# Patient Record
Sex: FEMALE | Race: WHITE | Employment: UNEMPLOYED | URBAN - METROPOLITAN AREA
[De-identification: names, ages, dates, MRNs, and addresses within clinical notes are randomized per-mention and may not be internally consistent; named-entity substitution may affect disease eponyms.]

---

## 2018-12-07 ENCOUNTER — HOSPITAL ENCOUNTER (EMERGENCY)
Age: 43
Discharge: HOME | End: 2018-12-07
Attending: EMERGENCY MEDICINE
Payer: SELF-PAY

## 2018-12-07 ENCOUNTER — APPOINTMENT (OUTPATIENT)
Dept: GENERAL RADIOLOGY | Age: 43
End: 2018-12-07
Attending: EMERGENCY MEDICINE
Payer: SELF-PAY

## 2018-12-07 VITALS
TEMPERATURE: 98.5 F | RESPIRATION RATE: 18 BRPM | BODY MASS INDEX: 37.25 KG/M2 | DIASTOLIC BLOOD PRESSURE: 108 MMHG | HEART RATE: 99 BPM | HEIGHT: 61 IN | WEIGHT: 197.31 LBS | SYSTOLIC BLOOD PRESSURE: 155 MMHG | OXYGEN SATURATION: 95 %

## 2018-12-07 DIAGNOSIS — J06.9 VIRAL URI WITH COUGH: Primary | ICD-10-CM

## 2018-12-07 DIAGNOSIS — F17.200 SMOKING: ICD-10-CM

## 2018-12-07 LAB
DEPRECATED S PYO AG THROAT QL EIA: NEGATIVE
FLUAV AG NPH QL IA: NEGATIVE
FLUBV AG NOSE QL IA: NEGATIVE

## 2018-12-07 PROCEDURE — 74011250637 HC RX REV CODE- 250/637: Performed by: EMERGENCY MEDICINE

## 2018-12-07 PROCEDURE — 87804 INFLUENZA ASSAY W/OPTIC: CPT

## 2018-12-07 PROCEDURE — 87070 CULTURE OTHR SPECIMN AEROBIC: CPT

## 2018-12-07 PROCEDURE — 71046 X-RAY EXAM CHEST 2 VIEWS: CPT

## 2018-12-07 PROCEDURE — 87880 STREP A ASSAY W/OPTIC: CPT

## 2018-12-07 PROCEDURE — 99283 EMERGENCY DEPT VISIT LOW MDM: CPT

## 2018-12-07 RX ORDER — BENZONATATE 100 MG/1
100 CAPSULE ORAL
Qty: 30 CAP | Refills: 0 | Status: SHIPPED | OUTPATIENT
Start: 2018-12-07 | End: 2018-12-14

## 2018-12-07 RX ORDER — ACETAMINOPHEN 500 MG
1000 TABLET ORAL ONCE
Status: COMPLETED | OUTPATIENT
Start: 2018-12-07 | End: 2018-12-07

## 2018-12-07 RX ADMIN — ACETAMINOPHEN 1000 MG: 500 TABLET ORAL at 00:38

## 2018-12-07 NOTE — ED PROVIDER NOTES
'started coughing more than usual 3-4d ago (non-prod)/ thought it was a new office/ then past 2 days been having body aches/ today had a fever to 102/ took ibu at 6-7 pm'; pt denies HA, vison changes, diff swallowing,exertional CP, SOB, Abd pain, F/Ch, N/V, D/Cons or other current systemic complaints    Pt admits 'still smoking/ coughing seems worse today'; The history is provided by the patient and a relative. Cough   This is a new problem. The current episode started more than 2 days ago. The problem occurs hourly. The problem has been gradually worsening. The cough is non-productive. There has been a fever of 102 - 102.9 F. The fever has been present for less than 1 day. Associated symptoms include chills, ear congestion, headaches, rhinorrhea, sore throat and myalgias. Pertinent negatives include no chest pain, no sweats, no weight loss, no eye redness, no ear pain, no shortness of breath, no wheezing, no nausea, no vomiting and no confusion. Treatments tried: tylenol. The treatment provided moderate relief. Risk factors: denies. She is a smoker. Past medical history comments: pancreatitis, smoker.         Past Medical History:   Diagnosis Date    Pancreatitis        Past Surgical History:   Procedure Laterality Date    HX  SECTION      2 cesareans    HX CHOLECYSTECTOMY           Family History:   Problem Relation Age of Onset    Diabetes Mother     Heart Disease Mother     Hypertension Mother     Alzheimer Mother     Schizophrenia Mother        Social History     Socioeconomic History    Marital status:      Spouse name:     Number of children: Not on file    Years of education: Not on file    Highest education level: Not on file   Social Needs    Financial resource strain: Not on file    Food insecurity - worry: Not on file    Food insecurity - inability: Not on file   [employer] needs - medical: Not on file   [employer] needs - non-medical: Not on file   Occupational History    Not on file   Tobacco Use    Smoking status: Current Every Day Smoker     Packs/day: 1.00     Types: Cigarettes     Start date: 1/1/1999   Substance and Sexual Activity    Alcohol use: No    Drug use: No    Sexual activity: Yes     Partners: Male     Comment:    Other Topics Concern    Not on file   Social History Narrative    Works as / at [address] [place] [address]: Patient has no known allergies. Review of Systems   Constitutional: Positive for chills. Negative for weight loss. HENT: Positive for rhinorrhea and sore throat. Negative for ear pain, trouble swallowing and voice change. Eyes: Negative for redness and visual disturbance. Respiratory: Positive for cough. Negative for choking, chest tightness, shortness of breath, wheezing and stridor. Cardiovascular: Negative for chest pain, palpitations and leg swelling. Gastrointestinal: Negative for abdominal pain, constipation, diarrhea, nausea and vomiting. Genitourinary: Negative for dysuria. Musculoskeletal: Positive for myalgias. Neurological: Positive for headaches. Psychiatric/Behavioral: Negative for confusion. All other systems reviewed and are negative. There were no vitals filed for this visit. Physical Exam   Constitutional: She is oriented to person, place, and time. She appears well-developed and well-nourished. NAD, AxOx4, speaking in complete sentences     HENT:   Head: Normocephalic and atraumatic. Right Ear: Hearing and external ear normal.   Left Ear: Hearing and external ear normal.   Nose: Mucosal edema and rhinorrhea present. Right sinus exhibits no frontal sinus tenderness. Left sinus exhibits no maxillary sinus tenderness and no frontal sinus tenderness. Mouth/Throat: Uvula is midline and mucous membranes are normal. No oral lesions. No uvula swelling. Posterior oropharyngeal erythema present.  No oropharyngeal exudate, posterior oropharyngeal edema or tonsillar abscesses. Tonsils are 1+ on the right. Tonsils are 1+ on the left. No tonsillar exudate. No meningeal signs     Eyes: Conjunctivae are normal. Pupils are equal, round, and reactive to light. Right eye exhibits no discharge. No scleral icterus. Neck: Normal range of motion. Neck supple. No JVD present. No tracheal deviation present. Cardiovascular: Normal rate, regular rhythm and normal heart sounds. Exam reveals no gallop and no friction rub. No murmur heard. Pulmonary/Chest: Effort normal and breath sounds normal. No respiratory distress. She has no wheezes. She has no rales. She exhibits no tenderness. Abdominal: Soft. Bowel sounds are normal. There is no tenderness. There is no rebound and no guarding. nttp     Genitourinary: No vaginal discharge found. Genitourinary Comments: Pt denies urinary/ vaginal complaints   Musculoskeletal: Normal range of motion. She exhibits no edema or tenderness. Neurological: She is alert and oriented to person, place, and time. She displays normal reflexes. No cranial nerve deficit or sensory deficit. She exhibits normal muscle tone. Coordination normal.   Skin: Skin is warm and dry. Capillary refill takes less than 2 seconds. No rash noted. No erythema. No pallor. Psychiatric: She has a normal mood and affect. Her behavior is normal. Thought content normal.   Nursing note and vitals reviewed. MDM       Procedures      1:05 AM pt 'doing better'; agrees w/ plans/ tessaloin perles rx/ told of need to stop smoking  Margoth Grande's  results have been reviewed with her. She has been counseled regarding her diagnosis. She verbally conveys understanding and agreement of the signs, symptoms, diagnosis, treatment and prognosis and additionally agrees to Call/ Arrange follow up as recommended with Dr. None in 24 - 48 hours. She also agrees with the care-plan and conveys that all of her questions have been answered.   I have also put together some discharge instructions for her that include: 1) educational information regarding their diagnosis, 2) how to care for their diagnosis at home, as well a 3) list of reasons why they would want to return to the ED prior to their follow-up appointment, should their condition change or for concerns.

## 2018-12-07 NOTE — ED NOTES
I have reviewed discharge instructions with the patient. The patient verbalized understanding. No changes in pt status. Ambulated out with steady gait.

## 2018-12-07 NOTE — DISCHARGE INSTRUCTIONS
Upper Respiratory Infection (Cold): Care Instructions  Your Care Instructions    An upper respiratory infection, or URI, is an infection of the nose, sinuses, or throat. URIs are spread by coughs, sneezes, and direct contact. The common cold is the most frequent kind of URI. The flu and sinus infections are other kinds of URIs. Almost all URIs are caused by viruses. Antibiotics won't cure them. But you can treat most infections with home care. This may include drinking lots of fluids and taking over-the-counter pain medicine. You will probably feel better in 4 to 10 days. The doctor has checked you carefully, but problems can develop later. If you notice any problems or new symptoms, get medical treatment right away. Follow-up care is a key part of your treatment and safety. Be sure to make and go to all appointments, and call your doctor if you are having problems. It's also a good idea to know your test results and keep a list of the medicines you take. How can you care for yourself at home? · To prevent dehydration, drink plenty of fluids, enough so that your urine is light yellow or clear like water. Choose water and other caffeine-free clear liquids until you feel better. If you have kidney, heart, or liver disease and have to limit fluids, talk with your doctor before you increase the amount of fluids you drink. · Take an over-the-counter pain medicine, such as acetaminophen (Tylenol), ibuprofen (Advil, Motrin), or naproxen (Aleve). Read and follow all instructions on the label. · Before you use cough and cold medicines, check the label. These medicines may not be safe for young children or for people with certain health problems. · Be careful when taking over-the-counter cold or flu medicines and Tylenol at the same time. Many of these medicines have acetaminophen, which is Tylenol. Read the labels to make sure that you are not taking more than the recommended dose.  Too much acetaminophen (Tylenol) can be harmful. · Get plenty of rest.  · Do not smoke or allow others to smoke around you. If you need help quitting, talk to your doctor about stop-smoking programs and medicines. These can increase your chances of quitting for good. When should you call for help? Call 911 anytime you think you may need emergency care. For example, call if:    · You have severe trouble breathing.    Call your doctor now or seek immediate medical care if:    · You seem to be getting much sicker.     · You have new or worse trouble breathing.     · You have a new or higher fever.     · You have a new rash.    Watch closely for changes in your health, and be sure to contact your doctor if:    · You have a new symptom, such as a sore throat, an earache, or sinus pain.     · You cough more deeply or more often, especially if you notice more mucus or a change in the color of your mucus.     · You do not get better as expected. Where can you learn more? Go to http://kirk-keila.info/. Enter J046 in the search box to learn more about \"Upper Respiratory Infection (Cold): Care Instructions. \"  Current as of: December 6, 2017  Content Version: 11.8  © 7617-4774 Healthwise, [employer]. Care instructions adapted under license by [employer] (which disclaims liability or warranty for this information). If you have questions about a medical condition or this instruction, always ask your healthcare professional. [place] [address] any warranty or liability for your use of this information. Stopping Smoking: Care Instructions  Your Care Instructions  Cigarette smokers crave the nicotine in cigarettes. Giving it up is much harder than simply changing a habit. Your body has to stop craving the nicotine. It is hard to quit, but you can do it. There are many tools that people use to quit smoking. You may find that combining tools works best for you.   There are several steps to quitting. First you get ready to quit. Then you get support to help you. After that, you learn new skills and behaviors to become a nonsmoker. For many people, a necessary step is getting and using medicine. Your doctor will help you set up the plan that best meets your needs. You may want to attend a smoking cessation program to help you quit smoking. When you choose a program, look for one that has proven success. Ask your doctor for ideas. You will greatly increase your chances of success if you take medicine as well as get counseling or join a cessation program.  Some of the changes you feel when you first quit tobacco are uncomfortable. Your body will miss the nicotine at first, and you may feel short-tempered and grumpy. You may have trouble sleeping or concentrating. Medicine can help you deal with these symptoms. You may struggle with changing your smoking habits and rituals. The last step is the tricky one: Be prepared for the smoking urge to continue for a time. This is a lot to deal with, but keep at it. You will feel better. Follow-up care is a key part of your treatment and safety. Be sure to make and go to all appointments, and call your doctor if you are having problems. It's also a good idea to know your test results and keep a list of the medicines you take. How can you care for yourself at home? · Ask your family, friends, and coworkers for support. You have a better chance of quitting if you have help and support. · Join a support group, such as Nicotine Anonymous, for people who are trying to quit smoking. · Consider signing up for a smoking cessation program, such as the American Lung Association's Freedom from Smoking program.  · Get text messaging support. Go to the website at www.smokefree. gov to sign up for the [facility] program.  · Set a quit date. Pick your date carefully so that it is not right in the middle of a big deadline or stressful time.  Once you quit, do not even take a puff. Get rid of all ashtrays and lighters after your last cigarette. Clean your house and your clothes so that they do not smell of smoke. · Learn how to be a nonsmoker. Think about ways you can avoid those things that make you reach for a cigarette. ? Avoid situations that put you at greatest risk for smoking. For some people, it is hard to have a drink with friends without smoking. For others, they might skip a coffee break with coworkers who smoke. ? Change your daily routine. Take a different route to work or eat a meal in a different place. · Cut down on stress. Calm yourself or release tension by doing an activity you enjoy, such as reading a book, taking a hot bath, or gardening. · Talk to your doctor or pharmacist about nicotine replacement therapy, which replaces the nicotine in your body. You still get nicotine but you do not use tobacco. Nicotine replacement products help you slowly reduce the amount of nicotine you need. These products come in several forms, many of them available over-the-counter:  ? Nicotine patches  ? Nicotine gum and lozenges  ? Nicotine inhaler  · Ask your doctor about bupropion (Wellbutrin) or varenicline (Chantix), which are prescription medicines. They do not contain nicotine. They help you by reducing withdrawal symptoms, such as stress and anxiety. · Some people find hypnosis, acupuncture, and massage helpful for ending the smoking habit. · Eat a healthy diet and get regular exercise. Having healthy habits will help your body move past its craving for nicotine. · Be prepared to keep trying. Most people are not successful the first few times they try to quit. Do not get mad at yourself if you smoke again. Make a list of things you learned and think about when you want to try again, such as next week, next month, or next year. Where can you learn more? Go to http://kirk-keila.info/.   Enter J390 in the search box to learn more about \"Stopping Smoking: Care Instructions. \"  Current as of: November 29, 2017  Content Version: 11.8  © 0831-2839 Healthwise, [employer]. Care instructions adapted under license by [employer] (which disclaims liability or warranty for this information). If you have questions about a medical condition or this instruction, always ask your healthcare professional. [place] [address] any warranty or liability for your use of this information.

## 2018-12-09 LAB
BACTERIA SPEC CULT: NORMAL
SERVICE CMNT-IMP: NORMAL

## 2019-09-17 ENCOUNTER — APPOINTMENT (OUTPATIENT)
Dept: CT IMAGING | Age: 44
End: 2019-09-17
Attending: EMERGENCY MEDICINE
Payer: MEDICAID

## 2019-09-17 ENCOUNTER — HOSPITAL ENCOUNTER (EMERGENCY)
Age: 44
Discharge: HOME | End: 2019-09-17
Attending: EMERGENCY MEDICINE
Payer: MEDICAID

## 2019-09-17 VITALS
DIASTOLIC BLOOD PRESSURE: 96 MMHG | SYSTOLIC BLOOD PRESSURE: 146 MMHG | BODY MASS INDEX: 32.26 KG/M2 | OXYGEN SATURATION: 94 % | HEIGHT: 61 IN | TEMPERATURE: 98.1 F | HEART RATE: 67 BPM | WEIGHT: 170.86 LBS | RESPIRATION RATE: 16 BRPM

## 2019-09-17 DIAGNOSIS — R11.2 NAUSEA VOMITING AND DIARRHEA: Primary | ICD-10-CM

## 2019-09-17 DIAGNOSIS — E87.6 HYPOKALEMIA: ICD-10-CM

## 2019-09-17 DIAGNOSIS — E83.42 HYPOMAGNESEMIA: ICD-10-CM

## 2019-09-17 DIAGNOSIS — R19.7 NAUSEA VOMITING AND DIARRHEA: Primary | ICD-10-CM

## 2019-09-17 LAB
ALBUMIN SERPL-MCNC: 3.1 G/DL (ref 3.5–5)
ALBUMIN/GLOB SERPL: 0.9 {RATIO} (ref 1.1–2.2)
ALP SERPL-CCNC: 122 U/L (ref 45–117)
ALT SERPL-CCNC: 29 U/L (ref 12–78)
ANION GAP SERPL CALC-SCNC: 13 MMOL/L (ref 5–15)
APPEARANCE UR: CLEAR
AST SERPL-CCNC: 33 U/L (ref 15–37)
ATRIAL RATE: 72 BPM
BASOPHILS # BLD: 0 K/UL (ref 0–0.1)
BASOPHILS NFR BLD: 0 % (ref 0–1)
BILIRUB SERPL-MCNC: 0.7 MG/DL (ref 0.2–1)
BILIRUB UR QL: NEGATIVE
BUN SERPL-MCNC: 3 MG/DL (ref 6–20)
BUN/CREAT SERPL: 5 (ref 12–20)
CALCIUM SERPL-MCNC: 8.8 MG/DL (ref 8.5–10.1)
CALCULATED P AXIS, ECG09: 18 DEGREES
CALCULATED R AXIS, ECG10: -7 DEGREES
CALCULATED T AXIS, ECG11: 4 DEGREES
CHLORIDE SERPL-SCNC: 100 MMOL/L (ref 97–108)
CO2 SERPL-SCNC: 28 MMOL/L (ref 21–32)
COLOR UR: NORMAL
COMMENT, HOLDF: NORMAL
CREAT SERPL-MCNC: 0.65 MG/DL (ref 0.55–1.02)
DIAGNOSIS, 93000: NORMAL
DIFFERENTIAL METHOD BLD: ABNORMAL
EOSINOPHIL # BLD: 0.1 K/UL (ref 0–0.4)
EOSINOPHIL NFR BLD: 1 % (ref 0–7)
ERYTHROCYTE [DISTWIDTH] IN BLOOD BY AUTOMATED COUNT: 12.7 % (ref 11.5–14.5)
GLOBULIN SER CALC-MCNC: 3.6 G/DL (ref 2–4)
GLUCOSE SERPL-MCNC: 117 MG/DL (ref 65–100)
GLUCOSE UR STRIP.AUTO-MCNC: NEGATIVE MG/DL
HCG UR QL: NEGATIVE
HCT VFR BLD AUTO: 44.1 % (ref 35–47)
HGB BLD-MCNC: 15.3 G/DL (ref 11.5–16)
HGB UR QL STRIP: NEGATIVE
IMM GRANULOCYTES # BLD AUTO: 0.1 K/UL (ref 0–0.04)
IMM GRANULOCYTES NFR BLD AUTO: 0 % (ref 0–0.5)
KETONES UR QL STRIP.AUTO: NEGATIVE MG/DL
LEUKOCYTE ESTERASE UR QL STRIP.AUTO: NEGATIVE
LIPASE SERPL-CCNC: 162 U/L (ref 73–393)
LYMPHOCYTES # BLD: 3.2 K/UL (ref 0.8–3.5)
LYMPHOCYTES NFR BLD: 28 % (ref 12–49)
MAGNESIUM SERPL-MCNC: 1.3 MG/DL (ref 1.6–2.4)
MCH RBC QN AUTO: 33.6 PG (ref 26–34)
MCHC RBC AUTO-ENTMCNC: 34.7 G/DL (ref 30–36.5)
MCV RBC AUTO: 96.9 FL (ref 80–99)
MONOCYTES # BLD: 0.5 K/UL (ref 0–1)
MONOCYTES NFR BLD: 4 % (ref 5–13)
NEUTS SEG # BLD: 7.5 K/UL (ref 1.8–8)
NEUTS SEG NFR BLD: 67 % (ref 32–75)
NITRITE UR QL STRIP.AUTO: NEGATIVE
NRBC # BLD: 0 K/UL (ref 0–0.01)
NRBC BLD-RTO: 0 PER 100 WBC
P-R INTERVAL, ECG05: 144 MS
PH UR STRIP: 7.5 [PH] (ref 5–8)
PLATELET # BLD AUTO: 151 K/UL (ref 150–400)
PMV BLD AUTO: 11.1 FL (ref 8.9–12.9)
POTASSIUM SERPL-SCNC: 3 MMOL/L (ref 3.5–5.1)
PROT SERPL-MCNC: 6.7 G/DL (ref 6.4–8.2)
PROT UR STRIP-MCNC: NEGATIVE MG/DL
Q-T INTERVAL, ECG07: 454 MS
QRS DURATION, ECG06: 76 MS
QTC CALCULATION (BEZET), ECG08: 497 MS
RBC # BLD AUTO: 4.55 M/UL (ref 3.8–5.2)
SAMPLES BEING HELD,HOLD: NORMAL
SODIUM SERPL-SCNC: 141 MMOL/L (ref 136–145)
SP GR UR REFRACTOMETRY: 1.01 (ref 1–1.03)
TROPONIN I SERPL-MCNC: <0.05 NG/ML
UR CULT HOLD, URHOLD: NORMAL
UROBILINOGEN UR QL STRIP.AUTO: 0.2 EU/DL (ref 0.2–1)
VENTRICULAR RATE, ECG03: 72 BPM
WBC # BLD AUTO: 11.3 K/UL (ref 3.6–11)

## 2019-09-17 PROCEDURE — 99284 EMERGENCY DEPT VISIT MOD MDM: CPT

## 2019-09-17 PROCEDURE — 93005 ELECTROCARDIOGRAM TRACING: CPT

## 2019-09-17 PROCEDURE — 96374 THER/PROPH/DIAG INJ IV PUSH: CPT

## 2019-09-17 PROCEDURE — 83690 ASSAY OF LIPASE: CPT

## 2019-09-17 PROCEDURE — 74011250636 HC RX REV CODE- 250/636

## 2019-09-17 PROCEDURE — 74177 CT ABD & PELVIS W/CONTRAST: CPT

## 2019-09-17 PROCEDURE — 83735 ASSAY OF MAGNESIUM: CPT

## 2019-09-17 PROCEDURE — 96361 HYDRATE IV INFUSION ADD-ON: CPT

## 2019-09-17 PROCEDURE — 36415 COLL VENOUS BLD VENIPUNCTURE: CPT

## 2019-09-17 PROCEDURE — 81003 URINALYSIS AUTO W/O SCOPE: CPT

## 2019-09-17 PROCEDURE — 80053 COMPREHEN METABOLIC PANEL: CPT

## 2019-09-17 PROCEDURE — 85025 COMPLETE CBC W/AUTO DIFF WBC: CPT

## 2019-09-17 PROCEDURE — 84484 ASSAY OF TROPONIN QUANT: CPT

## 2019-09-17 PROCEDURE — 96366 THER/PROPH/DIAG IV INF ADDON: CPT

## 2019-09-17 PROCEDURE — 74011250637 HC RX REV CODE- 250/637: Performed by: EMERGENCY MEDICINE

## 2019-09-17 PROCEDURE — 74011636320 HC RX REV CODE- 636/320: Performed by: EMERGENCY MEDICINE

## 2019-09-17 PROCEDURE — 74011250636 HC RX REV CODE- 250/636: Performed by: EMERGENCY MEDICINE

## 2019-09-17 PROCEDURE — 81025 URINE PREGNANCY TEST: CPT

## 2019-09-17 PROCEDURE — 74011000258 HC RX REV CODE- 258: Performed by: EMERGENCY MEDICINE

## 2019-09-17 RX ORDER — LISINOPRIL 20 MG/1
20 TABLET ORAL DAILY
COMMUNITY
End: 2020-02-06

## 2019-09-17 RX ORDER — POTASSIUM CHLORIDE 14.9 MG/ML
10 INJECTION INTRAVENOUS
Status: COMPLETED | OUTPATIENT
Start: 2019-09-17 | End: 2019-09-17

## 2019-09-17 RX ORDER — SODIUM CHLORIDE 0.9 % (FLUSH) 0.9 %
10 SYRINGE (ML) INJECTION ONCE
Status: COMPLETED | OUTPATIENT
Start: 2019-09-17 | End: 2019-09-17

## 2019-09-17 RX ORDER — ONDANSETRON 2 MG/ML
4 INJECTION INTRAMUSCULAR; INTRAVENOUS
Status: COMPLETED | OUTPATIENT
Start: 2019-09-17 | End: 2019-09-17

## 2019-09-17 RX ORDER — ONDANSETRON 2 MG/ML
INJECTION INTRAMUSCULAR; INTRAVENOUS
Status: COMPLETED
Start: 2019-09-17 | End: 2019-09-17

## 2019-09-17 RX ORDER — METOCLOPRAMIDE 10 MG/1
10 TABLET ORAL
Qty: 20 TAB | Refills: 0 | Status: SHIPPED | OUTPATIENT
Start: 2019-09-17 | End: 2019-09-27

## 2019-09-17 RX ORDER — MAGNESIUM SULFATE 1 G/100ML
1 INJECTION INTRAVENOUS
Status: COMPLETED | OUTPATIENT
Start: 2019-09-17 | End: 2019-09-17

## 2019-09-17 RX ORDER — POTASSIUM CHLORIDE 750 MG/1
20 TABLET, FILM COATED, EXTENDED RELEASE ORAL
Status: COMPLETED | OUTPATIENT
Start: 2019-09-17 | End: 2019-09-17

## 2019-09-17 RX ORDER — ESCITALOPRAM OXALATE 10 MG/1
10 TABLET ORAL DAILY
COMMUNITY
End: 2020-02-06

## 2019-09-17 RX ADMIN — SODIUM CHLORIDE 50 ML: 900 INJECTION, SOLUTION INTRAVENOUS at 12:26

## 2019-09-17 RX ADMIN — ONDANSETRON 4 MG: 2 INJECTION INTRAMUSCULAR; INTRAVENOUS at 12:02

## 2019-09-17 RX ADMIN — POTASSIUM CHLORIDE 20 MEQ: 750 TABLET, FILM COATED, EXTENDED RELEASE ORAL at 12:38

## 2019-09-17 RX ADMIN — POTASSIUM CHLORIDE 10 MEQ: 200 INJECTION, SOLUTION INTRAVENOUS at 12:38

## 2019-09-17 RX ADMIN — Medication 10 ML: at 12:26

## 2019-09-17 RX ADMIN — IOPAMIDOL 100 ML: 755 INJECTION, SOLUTION INTRAVENOUS at 12:26

## 2019-09-17 RX ADMIN — SODIUM CHLORIDE 1000 ML: 900 INJECTION, SOLUTION INTRAVENOUS at 12:02

## 2019-09-17 RX ADMIN — MAGNESIUM SULFATE HEPTAHYDRATE 1 G: 1 INJECTION, SOLUTION INTRAVENOUS at 12:38

## 2019-09-17 NOTE — ED TRIAGE NOTES
Pt states that she has had NVD for the past three weeks, pt has been seen four times for this, pt was told it was viral.

## 2019-09-17 NOTE — ED PROVIDER NOTES
49-year-old female with history of prior pancreatitis, status post cholecystectomy, presents to the emergency department stating that for the past 3 weeks she has had nausea, vomiting nonbloody emesis, and watery, nonbloody diarrhea. She was seen several times by her primary care physician as well as at [facility] and she was told that her symptoms were likely secondary to a virus. She has not had any imaging of her stomach. She took a couple doses of Zofran ODT last week to no avail of her symptoms, but has not been taking them any further. She has been able to tolerate p.o. fluids without any significant difficulty, but has had significant difficulty tolerating foods. She denies any history of prior IBS or IBD. She notes some predominantly left lower, left upper and right upper quadrant abdominal discomfort that she describes as dull and achy 5/10. She states \"I am not sure if that is just from throwing up so much. \"  He denies any dysuria, urgency, frequency, vaginal complaints, fever, chills, chest pain, shortness of breath. She denies any known sick contacts, recent travel, suspicious food intake.            Past Medical History:   Diagnosis Date    Pancreatitis        Past Surgical History:   Procedure Laterality Date    HX  SECTION      2 cesareans    HX CHOLECYSTECTOMY  2002         Family History:   Problem Relation Age of Onset    Diabetes Mother     Heart Disease Mother     Hypertension Mother     Alzheimer Mother     Schizophrenia Mother        Social History     Socioeconomic History    Marital status: LEGALLY      Spouse name:     Number of children: Not on file    Years of education: Not on file    Highest education level: Not on file   Occupational History    Not on file   Social Needs    Financial resource strain: Not on file    Food insecurity:     Worry: Not on file     Inability: Not on file    Transportation needs:     Medical: Not on file Non-medical: Not on file   Tobacco Use    Smoking status: Current Every Day Smoker     Packs/day: 1.50     Types: Cigarettes     Start date: 1/1/1999    Smokeless tobacco: Never Used   Substance and Sexual Activity    Alcohol use: No    Drug use: No    Sexual activity: Yes     Partners: Male     Comment:    Lifestyle    Physical activity:     Days per week: Not on file     Minutes per session: Not on file    Stress: Not on file   Relationships    Social connections:     Talks on phone: Not on file     Gets together: Not on file     Attends Gnosticist service: Not on file     Active member of club or organization: Not on file     Attends meetings of clubs or organizations: Not on file     Relationship status: Not on file    Intimate partner violence:     Fear of current or ex partner: Not on file     Emotionally abused: Not on file     Physically abused: Not on file     Forced sexual activity: Not on file   Other Topics Concern    Not on file   Social History Narrative    Works as / at [address] [place] [address]: Patient has no known allergies. Review of Systems   Constitutional: Positive for activity change, appetite change and fatigue. Negative for chills and fever. HENT: Negative for congestion, rhinorrhea, sinus pressure, sneezing and sore throat. Eyes: Negative for photophobia and visual disturbance. Respiratory: Negative for cough and shortness of breath. Cardiovascular: Negative for chest pain. Gastrointestinal: Positive for abdominal pain, diarrhea, nausea and vomiting. Negative for blood in stool and constipation. Genitourinary: Negative for difficulty urinating, dysuria, flank pain, frequency, hematuria, menstrual problem, urgency, vaginal bleeding and vaginal discharge. Musculoskeletal: Negative for arthralgias, back pain, myalgias and neck pain. Skin: Negative for rash and wound.    Neurological: Negative for syncope, weakness, numbness and headaches. Psychiatric/Behavioral: Negative for self-injury and suicidal ideas. All other systems reviewed and are negative. Vitals:    09/17/19 1136   BP: (!) 158/94   Pulse: 83   Resp: 18   Temp: 98.4 °F (36.9 °C)   SpO2: 94%   Weight: 77.5 kg (170 lb 13.7 oz)   Height: 5' 1\" (1.549 m)            Physical Exam   Constitutional: She is oriented to person, place, and time. She appears well-developed and well-nourished. No distress. Mildly ill-appearing   HENT:   Head: Normocephalic and atraumatic. Nose: Nose normal.   Dry mucous membranes. Oropharynx clear   Eyes: Pupils are equal, round, and reactive to light. Conjunctivae and EOM are normal.   Neck: Normal range of motion. Neck supple. Cardiovascular: Normal rate, regular rhythm, normal heart sounds and intact distal pulses. Pulmonary/Chest: Effort normal and breath sounds normal.   Abdominal: Soft. She exhibits no distension. There is tenderness in the right upper quadrant, epigastric area and left upper quadrant. There is no rigidity, no rebound, no guarding, no CVA tenderness, no tenderness at McBurney's point and negative Quinn's sign. Musculoskeletal: She exhibits no edema or tenderness. Neurological: She is alert and oriented to person, place, and time. She has normal strength. No cranial nerve deficit or sensory deficit. Coordination normal. GCS eye subscore is 4. GCS verbal subscore is 5. GCS motor subscore is 6. Skin: Skin is warm and dry. She is not diaphoretic. Nursing note and vitals reviewed. MDM   26-year-old female presents with 3 weeks of nausea vomiting diarrhea, found to have mild abdominal tenderness without peritonitis. Afebrile with vital signs stable, but dehydrated appearing. Patient declines any pain medication at this time. Given persistence of symptoms and tenderness on exam will proceed with CT of the pelvis to further evaluate. Given IV fluid bolus and Zofran IV.     Labs were drawn and showed WBC 11.3, K 3.0, Mg 1.3, but otherwise reassuring. Normal LFTs, troponin, lipase, renal function. She was given IV and p.o. K and Mg repletion. UA shows no evidence of infection or blood  Preg neg    CT abdomen pelvis shows no acute abnormalities to explain her symptoms. Differential still does include IBS, IBD, food intolerance, or foodborne illness. As the patient has not had significant improvement in her symptoms after Zofran will prescribe Reglan for further symptomatic relief. She was referred to GI for further evaluation of her symptoms. Return precautions were given for worsening or concerns. Procedures    1201 EKG shows normal sinus rhythm at a rate of 72 bpm with no acute ST or T wave abnormality suggestive of ischemia, but nonspecific changes.

## 2020-02-06 ENCOUNTER — HOSPITAL ENCOUNTER (OUTPATIENT)
Dept: PREADMISSION TESTING | Age: 45
Discharge: HOME | End: 2020-02-06
Payer: MEDICAID

## 2020-02-06 VITALS
SYSTOLIC BLOOD PRESSURE: 183 MMHG | HEIGHT: 61 IN | WEIGHT: 172 LBS | OXYGEN SATURATION: 95 % | BODY MASS INDEX: 32.47 KG/M2 | HEART RATE: 85 BPM | DIASTOLIC BLOOD PRESSURE: 115 MMHG | TEMPERATURE: 98.2 F

## 2020-02-06 DIAGNOSIS — K76.0 HEPATIC STEATOSIS: ICD-10-CM

## 2020-02-06 LAB
ALBUMIN SERPL-MCNC: 2.8 G/DL (ref 3.5–5)
ALBUMIN/GLOB SERPL: 0.9 {RATIO} (ref 1.1–2.2)
ALP SERPL-CCNC: 119 U/L (ref 45–117)
ALT SERPL-CCNC: 27 U/L (ref 12–78)
ANION GAP SERPL CALC-SCNC: 9 MMOL/L (ref 5–15)
AST SERPL-CCNC: 41 U/L (ref 15–37)
ATRIAL RATE: 82 BPM
BASOPHILS # BLD: 0.1 K/UL (ref 0–0.1)
BASOPHILS NFR BLD: 1 % (ref 0–1)
BILIRUB SERPL-MCNC: 0.3 MG/DL (ref 0.2–1)
BUN SERPL-MCNC: 3 MG/DL (ref 6–20)
BUN/CREAT SERPL: 5 (ref 12–20)
CALCIUM SERPL-MCNC: 9.1 MG/DL (ref 8.5–10.1)
CALCULATED P AXIS, ECG09: 41 DEGREES
CALCULATED R AXIS, ECG10: -8 DEGREES
CALCULATED T AXIS, ECG11: 8 DEGREES
CHLORIDE SERPL-SCNC: 104 MMOL/L (ref 97–108)
CO2 SERPL-SCNC: 27 MMOL/L (ref 21–32)
CREAT SERPL-MCNC: 0.66 MG/DL (ref 0.55–1.02)
DIAGNOSIS, 93000: NORMAL
DIFFERENTIAL METHOD BLD: ABNORMAL
EOSINOPHIL # BLD: 0.1 K/UL (ref 0–0.4)
EOSINOPHIL NFR BLD: 1 % (ref 0–7)
ERYTHROCYTE [DISTWIDTH] IN BLOOD BY AUTOMATED COUNT: 12.7 % (ref 11.5–14.5)
GLOBULIN SER CALC-MCNC: 3.1 G/DL (ref 2–4)
GLUCOSE SERPL-MCNC: 133 MG/DL (ref 65–100)
HCT VFR BLD AUTO: 40 % (ref 35–47)
HGB BLD-MCNC: 13.6 G/DL (ref 11.5–16)
IMM GRANULOCYTES # BLD AUTO: 0 K/UL (ref 0–0.04)
IMM GRANULOCYTES NFR BLD AUTO: 0 % (ref 0–0.5)
LYMPHOCYTES # BLD: 2.9 K/UL (ref 0.8–3.5)
LYMPHOCYTES NFR BLD: 28 % (ref 12–49)
MCH RBC QN AUTO: 33.7 PG (ref 26–34)
MCHC RBC AUTO-ENTMCNC: 34 G/DL (ref 30–36.5)
MCV RBC AUTO: 99 FL (ref 80–99)
MONOCYTES # BLD: 0.5 K/UL (ref 0–1)
MONOCYTES NFR BLD: 5 % (ref 5–13)
NEUTS SEG # BLD: 6.9 K/UL (ref 1.8–8)
NEUTS SEG NFR BLD: 65 % (ref 32–75)
NRBC # BLD: 0 K/UL (ref 0–0.01)
NRBC BLD-RTO: 0 PER 100 WBC
P-R INTERVAL, ECG05: 144 MS
PLATELET # BLD AUTO: 122 K/UL (ref 150–400)
PMV BLD AUTO: 12.2 FL (ref 8.9–12.9)
POTASSIUM SERPL-SCNC: 3 MMOL/L (ref 3.5–5.1)
PROT SERPL-MCNC: 5.9 G/DL (ref 6.4–8.2)
Q-T INTERVAL, ECG07: 402 MS
QRS DURATION, ECG06: 78 MS
QTC CALCULATION (BEZET), ECG08: 469 MS
RBC # BLD AUTO: 4.04 M/UL (ref 3.8–5.2)
SODIUM SERPL-SCNC: 140 MMOL/L (ref 136–145)
VENTRICULAR RATE, ECG03: 82 BPM
WBC # BLD AUTO: 10.5 K/UL (ref 3.6–11)

## 2020-02-06 PROCEDURE — 85025 COMPLETE CBC W/AUTO DIFF WBC: CPT

## 2020-02-06 PROCEDURE — 80053 COMPREHEN METABOLIC PANEL: CPT

## 2020-02-06 PROCEDURE — 93005 ELECTROCARDIOGRAM TRACING: CPT

## 2020-02-06 PROCEDURE — 36415 COLL VENOUS BLD VENIPUNCTURE: CPT

## 2020-02-06 RX ORDER — IBUPROFEN 200 MG
600 TABLET ORAL
COMMUNITY
End: 2022-10-16

## 2020-02-06 NOTE — PERIOP NOTES
PREOPERATIVE INSTRUCTIONS REVIEWED WITH PATIENT. PATIENT GIVEN TWO-- BOTTLES CHG SOAP. INSTRUCTIONS REVIEWED ON USE OF CHG SOAP. PATIENT GIVEN SSI INFECTIONS SHEET. PATIENT WAS GIVEN THE OPPORTUNITY TO ASK QUESTIONS ON THE INFORMATION PROVIDED. SPOKE WITH JUDITH--NURSE TO KADY TERAN NP RE: ELEVATED BP; PATIENT HAD STOPPED BP MEDS 6 MONTHS AGO. PATIENT WILL BE CONTACTED BY PCP/STAFF FOR APPOINTMENT TODAY OR TOMORROW WITH BP MEDICATION CALLED TO HER PHARMACY. SPOKE WITH NYLA--TRIAGE NURSE TO  [facility]; REPORTED THE ELEVATED BP AND HISTORY OF ALCOHOL ABUSE. SHE WILL NOTIFY  [facility].

## 2020-02-07 NOTE — PERIOP NOTES
PAT TEST RESULTS FAXED TO DR. MCGHEE'S OFFICE, ABNORMAL RESULTS CALLED TO CRYSTAL- TRIAGE NURSE. SHE WILL LOOK FOR THE FAX AND SHOW TO DR. MCGHEE.

## 2020-02-11 RX ORDER — LABETALOL 200 MG/1
TABLET, FILM COATED ORAL 2 TIMES DAILY
COMMUNITY
End: 2022-10-16

## 2020-02-13 PROBLEM — K76.0 HEPATIC STEATOSIS: Status: ACTIVE | Noted: 2020-02-13

## 2020-02-13 NOTE — ADVANCED PRACTICE NURSE
Per anesthesia consult with Dr Tonia Farrell, banana bag ordered DOS 2/19/20, due to patient's ETOH dependency/fifth of liquor daily per patient.

## 2020-02-17 ENCOUNTER — ANESTHESIA EVENT (OUTPATIENT)
Dept: SURGERY | Age: 45
End: 2020-02-17
Payer: MEDICAID

## 2020-02-19 ENCOUNTER — HOSPITAL ENCOUNTER (OUTPATIENT)
Age: 45
Setting detail: OUTPATIENT SURGERY
Discharge: HOME | End: 2020-02-19
Attending: SPECIALIST | Admitting: SPECIALIST
Payer: MEDICAID

## 2020-02-19 ENCOUNTER — ANESTHESIA (OUTPATIENT)
Dept: SURGERY | Age: 45
End: 2020-02-19
Payer: MEDICAID

## 2020-02-19 VITALS
SYSTOLIC BLOOD PRESSURE: 121 MMHG | OXYGEN SATURATION: 95 % | BODY MASS INDEX: 32.47 KG/M2 | HEIGHT: 61 IN | RESPIRATION RATE: 16 BRPM | TEMPERATURE: 97.8 F | HEART RATE: 84 BPM | WEIGHT: 172 LBS | DIASTOLIC BLOOD PRESSURE: 81 MMHG

## 2020-02-19 DIAGNOSIS — R52 PAIN: Primary | ICD-10-CM

## 2020-02-19 LAB — HCG UR QL: NEGATIVE

## 2020-02-19 PROCEDURE — 74011250637 HC RX REV CODE- 250/637: Performed by: NURSE ANESTHETIST, CERTIFIED REGISTERED

## 2020-02-19 PROCEDURE — 77030026243 HC MANIP UTER VCAR LSIS -B: Performed by: SPECIALIST

## 2020-02-19 PROCEDURE — 77030018778 HC MANIP UTER VCAR CNMD -B: Performed by: SPECIALIST

## 2020-02-19 PROCEDURE — 74011000250 HC RX REV CODE- 250: Performed by: SPECIALIST

## 2020-02-19 PROCEDURE — 77030008557 HC TBNG SMK EVAC STOR -B: Performed by: SPECIALIST

## 2020-02-19 PROCEDURE — 77030026438 HC STYL ET INTUB CARD -A: Performed by: NURSE ANESTHETIST, CERTIFIED REGISTERED

## 2020-02-19 PROCEDURE — 77030008756 HC TU IRR SUC STRY -B: Performed by: SPECIALIST

## 2020-02-19 PROCEDURE — 77030013533 HC SEAL FBRN TISSL RDYTUSE 10ML BAXT -E: Performed by: SPECIALIST

## 2020-02-19 PROCEDURE — 74011000250 HC RX REV CODE- 250: Performed by: NURSE ANESTHETIST, CERTIFIED REGISTERED

## 2020-02-19 PROCEDURE — 88342 IMHCHEM/IMCYTCHM 1ST ANTB: CPT

## 2020-02-19 PROCEDURE — 77030032060 HC PWDR HEMSTAT ARISTA ASRB 3GM BARD -C: Performed by: SPECIALIST

## 2020-02-19 PROCEDURE — 77030016151 HC PROTCTR LNS DFOG COVD -B: Performed by: SPECIALIST

## 2020-02-19 PROCEDURE — 76210000000 HC OR PH I REC 2 TO 2.5 HR: Performed by: SPECIALIST

## 2020-02-19 PROCEDURE — 77030003578 HC NDL INSUF VERES AMR -B: Performed by: SPECIALIST

## 2020-02-19 PROCEDURE — 88307 TISSUE EXAM BY PATHOLOGIST: CPT

## 2020-02-19 PROCEDURE — 77030040830 HC CATH URETH FOL MDII -A: Performed by: SPECIALIST

## 2020-02-19 PROCEDURE — 74011250637 HC RX REV CODE- 250/637: Performed by: ANESTHESIOLOGY

## 2020-02-19 PROCEDURE — 81025 URINE PREGNANCY TEST: CPT

## 2020-02-19 PROCEDURE — 77030040361 HC SLV COMPR DVT MDII -B: Performed by: SPECIALIST

## 2020-02-19 PROCEDURE — 77030035277 HC OBTRTR BLDELSS DISP INTU -B: Performed by: SPECIALIST

## 2020-02-19 PROCEDURE — 77030002933 HC SUT MCRYL J&J -A: Performed by: SPECIALIST

## 2020-02-19 PROCEDURE — 74011250636 HC RX REV CODE- 250/636: Performed by: SPECIALIST

## 2020-02-19 PROCEDURE — 77030008684 HC TU ET CUF COVD -B: Performed by: NURSE ANESTHETIST, CERTIFIED REGISTERED

## 2020-02-19 PROCEDURE — 77030002895 HC DEV VASC CLOSR COVD -B: Performed by: SPECIALIST

## 2020-02-19 PROCEDURE — 77030034133 HC APPL ENDOSCP FLX SPRY BAXT -C: Performed by: SPECIALIST

## 2020-02-19 PROCEDURE — 77030010507 HC ADH SKN DERMBND J&J -B: Performed by: SPECIALIST

## 2020-02-19 PROCEDURE — 77030040922 HC BLNKT HYPOTHRM STRY -A

## 2020-02-19 PROCEDURE — 74011250636 HC RX REV CODE- 250/636: Performed by: NURSE ANESTHETIST, CERTIFIED REGISTERED

## 2020-02-19 PROCEDURE — 76060000034 HC ANESTHESIA 1.5 TO 2 HR: Performed by: SPECIALIST

## 2020-02-19 PROCEDURE — 74011250636 HC RX REV CODE- 250/636: Performed by: ANESTHESIOLOGY

## 2020-02-19 PROCEDURE — 74011250636 HC RX REV CODE- 250/636: Performed by: NURSE PRACTITIONER

## 2020-02-19 PROCEDURE — 77030035236 HC SUT PDS STRATFX BARB J&J -B: Performed by: SPECIALIST

## 2020-02-19 PROCEDURE — 77030018836 HC SOL IRR NACL ICUM -A: Performed by: SPECIALIST

## 2020-02-19 PROCEDURE — 77030008598 HC TRCR ENDOSC BLDLS J&J -B: Performed by: SPECIALIST

## 2020-02-19 PROCEDURE — 77030011640 HC PAD GRND REM COVD -A: Performed by: SPECIALIST

## 2020-02-19 PROCEDURE — 74011000250 HC RX REV CODE- 250: Performed by: NURSE PRACTITIONER

## 2020-02-19 PROCEDURE — 74011250637 HC RX REV CODE- 250/637: Performed by: SPECIALIST

## 2020-02-19 PROCEDURE — 76010000875 HC OR TIME 1.5 TO 2HR INTENSV - TIER 2: Performed by: SPECIALIST

## 2020-02-19 PROCEDURE — 76210000021 HC REC RM PH II 0.5 TO 1 HR: Performed by: SPECIALIST

## 2020-02-19 PROCEDURE — 77030008603 HC TRCR ENDOSC EPATH J&J -C: Performed by: SPECIALIST

## 2020-02-19 RX ORDER — SODIUM CHLORIDE 9 MG/ML
25 INJECTION, SOLUTION INTRAVENOUS CONTINUOUS
Status: DISCONTINUED | OUTPATIENT
Start: 2020-02-19 | End: 2020-02-19 | Stop reason: HOSPADM

## 2020-02-19 RX ORDER — ENOXAPARIN SODIUM 100 MG/ML
40 INJECTION SUBCUTANEOUS
Status: COMPLETED | OUTPATIENT
Start: 2020-02-19 | End: 2020-02-19

## 2020-02-19 RX ORDER — MIDAZOLAM HYDROCHLORIDE 1 MG/ML
INJECTION, SOLUTION INTRAMUSCULAR; INTRAVENOUS AS NEEDED
Status: DISCONTINUED | OUTPATIENT
Start: 2020-02-19 | End: 2020-02-19 | Stop reason: HOSPADM

## 2020-02-19 RX ORDER — SODIUM CHLORIDE, SODIUM LACTATE, POTASSIUM CHLORIDE, CALCIUM CHLORIDE 600; 310; 30; 20 MG/100ML; MG/100ML; MG/100ML; MG/100ML
INJECTION, SOLUTION INTRAVENOUS
Status: DISCONTINUED | OUTPATIENT
Start: 2020-02-19 | End: 2020-02-19 | Stop reason: HOSPADM

## 2020-02-19 RX ORDER — FENTANYL CITRATE 50 UG/ML
50 INJECTION, SOLUTION INTRAMUSCULAR; INTRAVENOUS AS NEEDED
Status: DISCONTINUED | OUTPATIENT
Start: 2020-02-19 | End: 2020-02-19 | Stop reason: HOSPADM

## 2020-02-19 RX ORDER — ONDANSETRON 2 MG/ML
4 INJECTION INTRAMUSCULAR; INTRAVENOUS AS NEEDED
Status: DISCONTINUED | OUTPATIENT
Start: 2020-02-19 | End: 2020-02-19 | Stop reason: HOSPADM

## 2020-02-19 RX ORDER — ROCURONIUM BROMIDE 10 MG/ML
INJECTION, SOLUTION INTRAVENOUS AS NEEDED
Status: DISCONTINUED | OUTPATIENT
Start: 2020-02-19 | End: 2020-02-19 | Stop reason: HOSPADM

## 2020-02-19 RX ORDER — SODIUM CHLORIDE, SODIUM LACTATE, POTASSIUM CHLORIDE, CALCIUM CHLORIDE 600; 310; 30; 20 MG/100ML; MG/100ML; MG/100ML; MG/100ML
25 INJECTION, SOLUTION INTRAVENOUS CONTINUOUS
Status: DISCONTINUED | OUTPATIENT
Start: 2020-02-19 | End: 2020-02-19 | Stop reason: HOSPADM

## 2020-02-19 RX ORDER — SODIUM CHLORIDE 0.9 % (FLUSH) 0.9 %
5-40 SYRINGE (ML) INJECTION AS NEEDED
Status: DISCONTINUED | OUTPATIENT
Start: 2020-02-19 | End: 2020-02-19 | Stop reason: HOSPADM

## 2020-02-19 RX ORDER — HYDROMORPHONE HYDROCHLORIDE 1 MG/ML
0.5 INJECTION, SOLUTION INTRAMUSCULAR; INTRAVENOUS; SUBCUTANEOUS
Status: DISCONTINUED | OUTPATIENT
Start: 2020-02-19 | End: 2020-02-19 | Stop reason: HOSPADM

## 2020-02-19 RX ORDER — ALBUTEROL SULFATE 90 UG/1
AEROSOL, METERED RESPIRATORY (INHALATION) AS NEEDED
Status: DISCONTINUED | OUTPATIENT
Start: 2020-02-19 | End: 2020-02-19 | Stop reason: HOSPADM

## 2020-02-19 RX ORDER — PROPOFOL 10 MG/ML
INJECTION, EMULSION INTRAVENOUS AS NEEDED
Status: DISCONTINUED | OUTPATIENT
Start: 2020-02-19 | End: 2020-02-19 | Stop reason: HOSPADM

## 2020-02-19 RX ORDER — OXYCODONE HYDROCHLORIDE 5 MG/1
TABLET ORAL
Status: DISCONTINUED
Start: 2020-02-19 | End: 2020-02-19 | Stop reason: HOSPADM

## 2020-02-19 RX ORDER — BUPIVACAINE HYDROCHLORIDE 5 MG/ML
INJECTION, SOLUTION EPIDURAL; INTRACAUDAL AS NEEDED
Status: DISCONTINUED | OUTPATIENT
Start: 2020-02-19 | End: 2020-02-19 | Stop reason: HOSPADM

## 2020-02-19 RX ORDER — ACETAMINOPHEN 325 MG/1
650 TABLET ORAL ONCE
Status: COMPLETED | OUTPATIENT
Start: 2020-02-19 | End: 2020-02-19

## 2020-02-19 RX ORDER — FENTANYL CITRATE 50 UG/ML
25 INJECTION, SOLUTION INTRAMUSCULAR; INTRAVENOUS
Status: DISCONTINUED | OUTPATIENT
Start: 2020-02-19 | End: 2020-02-19 | Stop reason: HOSPADM

## 2020-02-19 RX ORDER — PHENYLEPHRINE HCL IN 0.9% NACL 0.4MG/10ML
SYRINGE (ML) INTRAVENOUS AS NEEDED
Status: DISCONTINUED | OUTPATIENT
Start: 2020-02-19 | End: 2020-02-19 | Stop reason: HOSPADM

## 2020-02-19 RX ORDER — LIDOCAINE HYDROCHLORIDE 20 MG/ML
INJECTION, SOLUTION EPIDURAL; INFILTRATION; INTRACAUDAL; PERINEURAL AS NEEDED
Status: DISCONTINUED | OUTPATIENT
Start: 2020-02-19 | End: 2020-02-19 | Stop reason: HOSPADM

## 2020-02-19 RX ORDER — GLYCOPYRROLATE 0.2 MG/ML
INJECTION INTRAMUSCULAR; INTRAVENOUS AS NEEDED
Status: DISCONTINUED | OUTPATIENT
Start: 2020-02-19 | End: 2020-02-19 | Stop reason: HOSPADM

## 2020-02-19 RX ORDER — MIDAZOLAM HYDROCHLORIDE 1 MG/ML
0.5 INJECTION, SOLUTION INTRAMUSCULAR; INTRAVENOUS
Status: DISCONTINUED | OUTPATIENT
Start: 2020-02-19 | End: 2020-02-19 | Stop reason: HOSPADM

## 2020-02-19 RX ORDER — HYDROMORPHONE HYDROCHLORIDE 2 MG/ML
INJECTION, SOLUTION INTRAMUSCULAR; INTRAVENOUS; SUBCUTANEOUS AS NEEDED
Status: DISCONTINUED | OUTPATIENT
Start: 2020-02-19 | End: 2020-02-19 | Stop reason: HOSPADM

## 2020-02-19 RX ORDER — MORPHINE SULFATE 10 MG/ML
2 INJECTION, SOLUTION INTRAMUSCULAR; INTRAVENOUS
Status: DISCONTINUED | OUTPATIENT
Start: 2020-02-19 | End: 2020-02-19 | Stop reason: HOSPADM

## 2020-02-19 RX ORDER — CEFAZOLIN SODIUM/WATER 2 G/20 ML
2 SYRINGE (ML) INTRAVENOUS
Status: COMPLETED | OUTPATIENT
Start: 2020-02-19 | End: 2020-02-19

## 2020-02-19 RX ORDER — SUCCINYLCHOLINE CHLORIDE 20 MG/ML
INJECTION INTRAMUSCULAR; INTRAVENOUS AS NEEDED
Status: DISCONTINUED | OUTPATIENT
Start: 2020-02-19 | End: 2020-02-19 | Stop reason: HOSPADM

## 2020-02-19 RX ORDER — SODIUM CHLORIDE, SODIUM LACTATE, POTASSIUM CHLORIDE, CALCIUM CHLORIDE 600; 310; 30; 20 MG/100ML; MG/100ML; MG/100ML; MG/100ML
100 INJECTION, SOLUTION INTRAVENOUS CONTINUOUS
Status: DISCONTINUED | OUTPATIENT
Start: 2020-02-19 | End: 2020-02-19 | Stop reason: HOSPADM

## 2020-02-19 RX ORDER — MIDAZOLAM HYDROCHLORIDE 1 MG/ML
1 INJECTION, SOLUTION INTRAMUSCULAR; INTRAVENOUS AS NEEDED
Status: DISCONTINUED | OUTPATIENT
Start: 2020-02-19 | End: 2020-02-19 | Stop reason: HOSPADM

## 2020-02-19 RX ORDER — LIDOCAINE HYDROCHLORIDE 10 MG/ML
0.1 INJECTION, SOLUTION EPIDURAL; INFILTRATION; INTRACAUDAL; PERINEURAL AS NEEDED
Status: DISCONTINUED | OUTPATIENT
Start: 2020-02-19 | End: 2020-02-19 | Stop reason: HOSPADM

## 2020-02-19 RX ORDER — KETOROLAC TROMETHAMINE 30 MG/ML
INJECTION, SOLUTION INTRAMUSCULAR; INTRAVENOUS AS NEEDED
Status: DISCONTINUED | OUTPATIENT
Start: 2020-02-19 | End: 2020-02-19 | Stop reason: HOSPADM

## 2020-02-19 RX ORDER — DEXMEDETOMIDINE HYDROCHLORIDE 100 UG/ML
INJECTION, SOLUTION INTRAVENOUS AS NEEDED
Status: DISCONTINUED | OUTPATIENT
Start: 2020-02-19 | End: 2020-02-19 | Stop reason: HOSPADM

## 2020-02-19 RX ORDER — DEXAMETHASONE SODIUM PHOSPHATE 4 MG/ML
INJECTION, SOLUTION INTRA-ARTICULAR; INTRALESIONAL; INTRAMUSCULAR; INTRAVENOUS; SOFT TISSUE AS NEEDED
Status: DISCONTINUED | OUTPATIENT
Start: 2020-02-19 | End: 2020-02-19 | Stop reason: HOSPADM

## 2020-02-19 RX ORDER — NEOSTIGMINE METHYLSULFATE 1 MG/ML
INJECTION INTRAVENOUS AS NEEDED
Status: DISCONTINUED | OUTPATIENT
Start: 2020-02-19 | End: 2020-02-19 | Stop reason: HOSPADM

## 2020-02-19 RX ORDER — ROPIVACAINE HYDROCHLORIDE 5 MG/ML
30 INJECTION, SOLUTION EPIDURAL; INFILTRATION; PERINEURAL AS NEEDED
Status: DISCONTINUED | OUTPATIENT
Start: 2020-02-19 | End: 2020-02-19 | Stop reason: HOSPADM

## 2020-02-19 RX ORDER — OXYCODONE HYDROCHLORIDE 5 MG/1
5 TABLET ORAL ONCE
Status: COMPLETED | OUTPATIENT
Start: 2020-02-19 | End: 2020-02-19

## 2020-02-19 RX ORDER — METRONIDAZOLE 500 MG/100ML
500 INJECTION, SOLUTION INTRAVENOUS
Status: COMPLETED | OUTPATIENT
Start: 2020-02-19 | End: 2020-02-19

## 2020-02-19 RX ORDER — FENTANYL CITRATE 50 UG/ML
INJECTION, SOLUTION INTRAMUSCULAR; INTRAVENOUS AS NEEDED
Status: DISCONTINUED | OUTPATIENT
Start: 2020-02-19 | End: 2020-02-19 | Stop reason: HOSPADM

## 2020-02-19 RX ORDER — DIPHENHYDRAMINE HYDROCHLORIDE 50 MG/ML
12.5 INJECTION, SOLUTION INTRAMUSCULAR; INTRAVENOUS AS NEEDED
Status: DISCONTINUED | OUTPATIENT
Start: 2020-02-19 | End: 2020-02-19 | Stop reason: HOSPADM

## 2020-02-19 RX ORDER — OXYCODONE HYDROCHLORIDE 5 MG/1
5 TABLET ORAL
Qty: 20 TAB | Refills: 0 | Status: SHIPPED | OUTPATIENT
Start: 2020-02-19 | End: 2020-02-24

## 2020-02-19 RX ORDER — SODIUM CHLORIDE 0.9 % (FLUSH) 0.9 %
5-40 SYRINGE (ML) INJECTION EVERY 8 HOURS
Status: DISCONTINUED | OUTPATIENT
Start: 2020-02-19 | End: 2020-02-19 | Stop reason: HOSPADM

## 2020-02-19 RX ORDER — KETAMINE HYDROCHLORIDE 10 MG/ML
INJECTION, SOLUTION INTRAMUSCULAR; INTRAVENOUS AS NEEDED
Status: DISCONTINUED | OUTPATIENT
Start: 2020-02-19 | End: 2020-02-19 | Stop reason: HOSPADM

## 2020-02-19 RX ORDER — EPHEDRINE SULFATE/0.9% NACL/PF 50 MG/5 ML
SYRINGE (ML) INTRAVENOUS AS NEEDED
Status: DISCONTINUED | OUTPATIENT
Start: 2020-02-19 | End: 2020-02-19 | Stop reason: HOSPADM

## 2020-02-19 RX ORDER — ONDANSETRON 2 MG/ML
INJECTION INTRAMUSCULAR; INTRAVENOUS AS NEEDED
Status: DISCONTINUED | OUTPATIENT
Start: 2020-02-19 | End: 2020-02-19 | Stop reason: HOSPADM

## 2020-02-19 RX ADMIN — DEXMEDETOMIDINE HYDROCHLORIDE 4 MCG: 100 INJECTION, SOLUTION, CONCENTRATE INTRAVENOUS at 09:42

## 2020-02-19 RX ADMIN — DEXAMETHASONE SODIUM PHOSPHATE 4 MG: 4 INJECTION, SOLUTION INTRAMUSCULAR; INTRAVENOUS at 09:16

## 2020-02-19 RX ADMIN — ONDANSETRON HYDROCHLORIDE 4 MG: 2 INJECTION, SOLUTION INTRAMUSCULAR; INTRAVENOUS at 09:16

## 2020-02-19 RX ADMIN — THIAMINE HYDROCHLORIDE: 100 INJECTION, SOLUTION INTRAMUSCULAR; INTRAVENOUS at 08:39

## 2020-02-19 RX ADMIN — ROCURONIUM BROMIDE 20 MG: 10 SOLUTION INTRAVENOUS at 09:13

## 2020-02-19 RX ADMIN — DEXMEDETOMIDINE HYDROCHLORIDE 4 MCG: 100 INJECTION, SOLUTION, CONCENTRATE INTRAVENOUS at 09:52

## 2020-02-19 RX ADMIN — Medication 2 G: at 09:18

## 2020-02-19 RX ADMIN — ROCURONIUM BROMIDE 10 MG: 10 SOLUTION INTRAVENOUS at 09:33

## 2020-02-19 RX ADMIN — SODIUM CHLORIDE, POTASSIUM CHLORIDE, SODIUM LACTATE AND CALCIUM CHLORIDE: 600; 310; 30; 20 INJECTION, SOLUTION INTRAVENOUS at 08:47

## 2020-02-19 RX ADMIN — KETAMINE HYDROCHLORIDE 20 MG: 10 INJECTION, SOLUTION INTRAMUSCULAR; INTRAVENOUS at 09:33

## 2020-02-19 RX ADMIN — MIDAZOLAM HYDROCHLORIDE 3 MG: 1 INJECTION, SOLUTION INTRAMUSCULAR; INTRAVENOUS at 08:57

## 2020-02-19 RX ADMIN — GLYCOPYRROLATE 0.4 MG: 0.2 INJECTION, SOLUTION INTRAMUSCULAR; INTRAVENOUS at 10:17

## 2020-02-19 RX ADMIN — LIDOCAINE HYDROCHLORIDE 80 MG: 20 INJECTION, SOLUTION EPIDURAL; INFILTRATION; INTRACAUDAL; PERINEURAL at 09:05

## 2020-02-19 RX ADMIN — FENTANYL CITRATE 50 MCG: 50 INJECTION, SOLUTION INTRAMUSCULAR; INTRAVENOUS at 09:05

## 2020-02-19 RX ADMIN — METRONIDAZOLE 500 MG: 500 SOLUTION INTRAVENOUS at 09:18

## 2020-02-19 RX ADMIN — ROCURONIUM BROMIDE 10 MG: 10 SOLUTION INTRAVENOUS at 10:11

## 2020-02-19 RX ADMIN — SUGAMMADEX 200 MG: 100 INJECTION, SOLUTION INTRAVENOUS at 10:23

## 2020-02-19 RX ADMIN — HYDROMORPHONE HYDROCHLORIDE 0.5 MG: 2 INJECTION, SOLUTION INTRAMUSCULAR; INTRAVENOUS; SUBCUTANEOUS at 10:29

## 2020-02-19 RX ADMIN — ENOXAPARIN SODIUM 40 MG: 40 INJECTION SUBCUTANEOUS at 08:17

## 2020-02-19 RX ADMIN — PROPOFOL 50 MG: 10 INJECTION, EMULSION INTRAVENOUS at 10:26

## 2020-02-19 RX ADMIN — OXYCODONE 5 MG: 5 TABLET ORAL at 12:49

## 2020-02-19 RX ADMIN — ROCURONIUM BROMIDE 10 MG: 10 SOLUTION INTRAVENOUS at 09:05

## 2020-02-19 RX ADMIN — DEXMEDETOMIDINE HYDROCHLORIDE 4 MCG: 100 INJECTION, SOLUTION, CONCENTRATE INTRAVENOUS at 09:48

## 2020-02-19 RX ADMIN — Medication 10 MG: at 10:37

## 2020-02-19 RX ADMIN — SUCCINYLCHOLINE CHLORIDE 140 MG: 20 INJECTION, SOLUTION INTRAMUSCULAR; INTRAVENOUS at 09:05

## 2020-02-19 RX ADMIN — ALBUTEROL SULFATE 6 PUFF: 90 AEROSOL, METERED RESPIRATORY (INHALATION) at 10:29

## 2020-02-19 RX ADMIN — Medication 80 MCG: at 10:32

## 2020-02-19 RX ADMIN — Medication 10 MG: at 10:42

## 2020-02-19 RX ADMIN — DEXMEDETOMIDINE HYDROCHLORIDE 4 MCG: 100 INJECTION, SOLUTION, CONCENTRATE INTRAVENOUS at 09:43

## 2020-02-19 RX ADMIN — NEOSTIGMINE METHYLSULFATE 3 MG: 1 INJECTION, SOLUTION INTRAMUSCULAR; INTRAVENOUS; SUBCUTANEOUS at 10:17

## 2020-02-19 RX ADMIN — KETOROLAC TROMETHAMINE 30 MG: 30 INJECTION, SOLUTION INTRAMUSCULAR; INTRAVENOUS at 10:06

## 2020-02-19 RX ADMIN — ACETAMINOPHEN 650 MG: 325 TABLET ORAL at 08:25

## 2020-02-19 RX ADMIN — SODIUM CHLORIDE, SODIUM LACTATE, POTASSIUM CHLORIDE, AND CALCIUM CHLORIDE 25 ML/HR: 600; 310; 30; 20 INJECTION, SOLUTION INTRAVENOUS at 08:14

## 2020-02-19 RX ADMIN — MIDAZOLAM HYDROCHLORIDE 2 MG: 1 INJECTION, SOLUTION INTRAMUSCULAR; INTRAVENOUS at 09:03

## 2020-02-19 RX ADMIN — PROPOFOL 150 MG: 10 INJECTION, EMULSION INTRAVENOUS at 09:05

## 2020-02-19 RX ADMIN — FENTANYL CITRATE 50 MCG: 50 INJECTION, SOLUTION INTRAMUSCULAR; INTRAVENOUS at 09:30

## 2020-02-19 RX ADMIN — HYDROMORPHONE HYDROCHLORIDE 0.5 MG: 2 INJECTION, SOLUTION INTRAMUSCULAR; INTRAVENOUS; SUBCUTANEOUS at 10:26

## 2020-02-19 NOTE — OP NOTES
[address]   OPERATIVE REPORT    Name:  Juan Hines  MR#:  [id number]  :  1975  ACCOUNT #:  [de-identified]  DATE OF SERVICE:  2020      PREOPERATIVE DIAGNOSES:  Menorrhagia, anemia, failed ablation. POSTOPERATIVE DIAGNOSES:  Menorrhagia, anemia, failed ablation. PROCEDURE PERFORMED:  Pearlean Creamer laparoscopic total hysterectomy with bilateral salpingectomy. SURGEON:  Demarco Caal MD    ASSISTANT:  Mark Barbosa MD    ANESTHESIA:  General endotracheal.    COMPLICATIONS:  None. SPECIMENS REMOVED:  Uterus, cervix, bilateral fallopian tubes. IMPLANTS:  None. ESTIMATED BLOOD LOSS:  30 mL. FINDINGS:  With laparoscopy, the uterus, tubes and ovaries appeared normal.    DESCRIPTION OF PROCEDURE:  The patient was taken to the operating room, placed on the operating room table. After adequate anesthesia was obtained, she was placed in the dorsal lithotomy position. The abdomen, perineum and vagina were prepped, and the patient was draped in the usual sterile fashion. A Barriga catheter was placed. A sterile speculum was placed in the vagina, and a single-tooth tenaculum was placed in the anterior lip of the cervix. The cervix was dilated and an uterine manipulator was placed. All other instruments were removed. The umbilical skin was grasped with two towel clips and the skin was tented. A Veress needle was inserted. The abdomen was insufflated to approximately 2 liters of CO2 gas. The Veress needle was removed. A small incision was made and an 8-mm bladeless da Romelia port was placed. Laparoscopy verified successful entry. Bilateral 8-mm da Romelia ports were placed under direct visualization and an insufflator port was placed in the right lower quadrant. The patient was placed in steep Trendelenburg. The [employer] robot was docked. Instruments were placed.   Attention was turned to the right utero-ovarian ligament, round ligament and fallopian tube which were cauterized using the fenestrated bipolar and cut using the hot nawaf. The right side of the bladder flap was created. Attention was turned to the left utero-ovarian ligament, round ligament and fallopian tube which were cauterized and cut in a similar fashion and the left side of the bladder flap was created. The bladder was dissected off the lower uterine segment and cervix using a combination of sharp and blunt dissection. The uterine vessels were bilaterally cauterized and cut. The uterus was retracted anteriorly and the vaginal cuff was entered posteriorly. Using the guidance of the uterine manipulator cup, the uterus and cervix were amputated from the vagina and delivered through the vagina. The vaginal cuff was closed using a running 2-0 Stratafix suture. There was good hemostasis at the vaginal cuff. Attention was turned to the right mesosalpinx which was cauterized and cut using the hot nawaf. The right tube was removed through the laparoscopic port. Attention was turned to the left mesosalpinx which was cauterized and cut in a similar fashion and the left tube was removed. The pelvis was irrigated and suctioned. Tisseel and Carlos were placed over all operative areas. Hemostasis was assured. Instruments were removed, and the robot was removed from the patient. The abdomen was desufflated and all trocars were removed. Skin incisions were closed using inverted subcuticular sutures of 4-0 Monocryl and Dermabond. There was clear urine draining from the Barriga at the end of procedure. All sponge, needle and instrument counts were correct x2 at the end of procedure. The patient tolerated the procedure well, was taken to the postanesthesia care unit in satisfactory condition.         MD RHIANNA [email or web address]  D:  02/19/2020 10:21  T:  02/19/2020 12:09  JOB #:  [id number]

## 2020-02-19 NOTE — H&P
Gynecology History and Physical    Name: Saskia Patel MRN: [id number] SSN: [id number]    YOB: 1975  Age: 40 y.o. Sex: female       Subjective:      Chief complaint:  Abnormal uterine bleeding    Lizett Sherwood is a 40 y.o.  female with a history of abnormal uterine bleeding. Previous workup included Ultrasound which revealed fibroid(s) and an endometrial biopsy which was benign. Previous treatment measures included nonsteroidal anti-inflammatory drugs (NSAID's), observation and oral contraceptives. She is admitted for Procedure(s) (LRB):  DAVINCI TOTAL LAPAROSCOPIC HYSTERECTOMY/POSSIBLE BILATERAL SALPINGO OOPHRORECTOMY (N/A). The current method of family planning is none. OB History        2    Para   2    Term   2            AB        Living           SAB        TAB        Ectopic        Molar        Multiple        Live Births                  Past Medical History:   Diagnosis Date    ETOH abuse     FIFTH LIQUOR DAILY; 2-3 YEARS    Hepatic steatosis 2020    Severe    Hypertension     NOT CURRENTLY TAKING BP MEDS, STOPPED 2019.  Ill-defined condition     SITUATIONAL ANXIETY AND DEPRESSION; NOT CURRENTLY TAKING MEDICATION.     Pancreatitis     HOSPITALIZED     Past Surgical History:   Procedure Laterality Date    HX  SECTION      x2    HX CHOLECYSTECTOMY      HX GYN  2019    ENDOMETRIAL ABLATION    HX TUBAL LIGATION       Social History     Occupational History    Not on file   Tobacco Use    Smoking status: Current Every Day Smoker     Packs/day: 1.50     Years: 30.00     Pack years: 45.00     Types: Cigarettes     Start date: 1999    Smokeless tobacco: Never Used   Substance and Sexual Activity    Alcohol use: Yes     Comment: FIFTH OF LIQUOR DAILY    Drug use: No    Sexual activity: Yes     Partners: Male     Comment:      Family History   Problem Relation Age of Onset    Diabetes Mother     Heart Disease Mother    [facility] Hypertension Mother     Alzheimer Mother    [place] Schizophrenia Mother     Anesth Problems Neg Hx         No Known Allergies  Prior to Admission medications    Medication Sig Start Date End Date Taking? Authorizing Provider   labetaloL (NORMODYNE) 200 mg tablet Take  by mouth two (2) times a day. Yes Provider, Historical   ibuprofen (MOTRIN) 200 mg tablet Take 600 mg by mouth every six (6) hours as needed for Pain. Yes Provider, Historical        Review of Systems:  A comprehensive review of systems was negative except for that written in the History of Present Illness. Objective:     Vitals:    02/19/20 0752   BP: 146/78   Pulse: 74   Resp: 18   Temp: 98.4 °F (36.9 °C)   SpO2: 95%   Weight: 78 kg (172 lb)   Height: 5' 1\" (1.549 m)       Physical Exam:  Patient without distress. Heart: Regular rate and rhythm  Lung: Bilateral wheezing  Abdomen: soft, nontender    Assessment:     Active Problems:    * No active hospital problems. *     Menorrhagia with dysmenorrhea    Plan:     Procedure(s) (LRB):  DAVINCI TOTAL LAPAROSCOPIC HYSTERECTOMY/POSSIBLE BILATERAL SALPINGO OOPHRORECTOMY (N/A)  Discussed the risks of surgery including the risks of bleeding, infection, deep vein thrombosis, and surgical injuries to internal organs including but not limited to the bowels, bladder, rectum, and female reproductive organs. The patient understands the risks; any and all questions were answered to the patient's satisfaction. Discussed at length with patient again long hx of ETOH abuse aswell as Smoking  Discussed her desire for d/c post surg and risks of death with heavy smoking today and ETOH abuse today.   All Q's answered and verbalized understanding

## 2020-02-19 NOTE — ROUTINE PROCESS
Patient: Alcira Gavin MRN: [id number]  SSN: [id number]   YOB: 1975  Age: 40 y.o. Sex: female     Patient is status post Procedure(s):  DAVINCI TOTAL LAPAROSCOPIC HYSTERECTOMY/POSSIBLE BILATERAL SALPINGO OOPHRORECTOMY.     Surgeon(s) and Role:     * Rex Cast MD - Primary     * Clarke Champagne MD - Assisting    Local/Dose/Irrigation:  30mL 0.5% marcaine plain given at closing                  Peripheral IV 02/19/20 Left Hand (Active)          Orogastric Tube 02/19/20 (Active)      Airway - Endotracheal Tube 02/19/20 Oral (Active)                   Dressing/Packing:  Wound Abdomen 4 Incisions-Dressing Type: Topical skin adhesive/glue(dermabond) (02/19/20 1004)    Splint/Cast:  ]    Other:  Barriga placed prior to incision

## 2020-02-19 NOTE — ANESTHESIA PREPROCEDURE EVALUATION
Relevant Problems   No relevant active problems       Anesthetic History   No history of anesthetic complications            Review of Systems / Medical History  Patient summary reviewed, nursing notes reviewed and pertinent labs reviewed    Pulmonary  Within defined limits                 Neuro/Psych   Within defined limits      Psychiatric history     Cardiovascular  Within defined limits  Hypertension              Exercise tolerance: >4 METS     GI/Hepatic/Renal  Within defined limits         Liver disease     Endo/Other  Within defined limits           Other Findings              Physical Exam    Airway  Mallampati: II  TM Distance: > 6 cm  Neck ROM: normal range of motion   Mouth opening: Normal     Cardiovascular  Regular rate and rhythm,  S1 and S2 normal,  no murmur, click, rub, or gallop             Dental  No notable dental hx       Pulmonary  Breath sounds clear to auscultation               Abdominal  GI exam deferred       Other Findings            Anesthetic Plan    ASA: 2  Anesthesia type: general          Induction: Intravenous  Anesthetic plan and risks discussed with: Patient

## 2020-02-19 NOTE — DISCHARGE INSTRUCTIONS
Patient Education        DISCHARGE SUMMARY from Nurse    The following personal items collected during your admission are returned to you:   Dental Appliance:    Vision:    Hearing Aid:    Jewelry:    Clothing: Clothing: (clothing returned to patient )  Other Valuables:    Valuables sent to safe: ALL CLOTHING/VALUABLES RETURNED TO PATIENT BEFORE D/C HOME    PATIENT INSTRUCTIONS:    The first meal should be something that is light; not greasy or spicy. If this is tolerated, then advance to regular diet as tolerated. Anesthesia Discharge Instructions    After general anesthesia or intervenous sedation, for 24 hours or while taking prescription Narcotics:  · Limit your activities  · Do not drive or operate hazardous machinery  · If you have not urinated within 8 hours after discharge, please contact your surgeon on call. · Do not make important personal or business decisions  · Do not drink alcoholic beverages    Report the following to your surgeon:  · Excessive pain, swelling, redness or odor of or around the surgical area  · Temperature over 100.5 degrees  · Nausea and vomiting lasting longer than 4 hours or if unable to take medication  · Any signs of decreased circulation or nerve impairment to extremity:  Change in color, persistent numbness, tingling, coldness or increased pain. · Any questions    Pain  · Take pain medication as directed by your doctor  ·  Call your doctor if pain is NOT relieved by medication  · DO NOT take aspirin or blood thinners until directed by your doctor       Follow-up Appointments   Procedures    FOLLOW UP VISIT Appointment in: Two Weeks     Standing Status:   Standing     Number of Occurrences:   1     Order Specific Question:   Appointment in     Answer:    Two Weeks         Follow-Up [employer]  · Are usually made nursing staff, the day after your surgery  · If you have any problems, call your doctor as needed    Laparoscopic Hysterectomy: What to Expect at Home  Your Recovery    A hysterectomy is surgery to take out the uterus. In some cases, the ovaries and fallopian tubes also are taken out at the same time. You can expect to feel better and stronger each day, but you may need pain medicine for a week or two. You may get tired easily or have less energy than usual. The tiredness may last for several weeks after surgery. You will probably notice that your belly is swollen and puffy. This is common. The swelling will take several weeks to go down. You may take about 4 to 6 weeks to fully recover. It is important to avoid lifting while you are recovering so that you can heal.  This care sheet gives you a general idea about how long it will take for you to recover. But each person recovers at a different pace. Follow the steps below to get better as quickly as possible. How can you care for yourself at home? Activity    · Rest when you feel tired.     · Be active. Walking is a good choice.     · Allow the area to heal. Don't move quickly or lift anything heavy until you are feeling better.     · You may shower 24 to 48 hours after surgery, if your doctor okays it. Pat the incision dry. Do not take a bath for the first 2 weeks, or until your doctor tells you it is okay.     · Ask your doctor when it is okay for you to have sex. Diet    · You can eat your normal diet. If your stomach is upset, try bland, low-fat foods like plain rice, broiled chicken, toast, and yogurt.     · If your bowel movements are not regular right after surgery, try to avoid constipation and straining. Drink plenty of water. Your doctor may suggest fiber, a stool softener, or a mild laxative. Medicines    · Your doctor will tell you if and when you can restart your medicines. He or she will also give you instructions about taking any new medicines.     · If you take blood thinners, such as warfarin (Coumadin), clopidogrel (Plavix), or aspirin, be sure to talk to your doctor.  He or she will tell you if and when to start taking those medicines again. Make sure that you understand exactly what your doctor wants you to do.     · Be safe with medicines. Read and follow all instructions on the label. ? If the doctor gave you a prescription medicine for pain, take it as prescribed. ? If you are not taking a prescription pain medicine, ask your doctor if you can take an over-the-counter medicine.     · If your doctor prescribed antibiotics, take them as directed. Do not stop taking them just because you feel better. You need to take the full course of antibiotics. Incision care    · You may have stitches over the cuts (incisions) the doctor made in your belly.     · If you have strips of tape on the cut (incision) the doctor made, leave the tape on for a week or until it falls off.     · Wash the area daily with warm, soapy water, and pat it dry. Don't use hydrogen peroxide or alcohol. They can slow healing.     · You may cover the area with a gauze bandage if it oozes fluid or rubs against clothing.     · Change the bandage every day. Other instructions    · You may have some light vaginal bleeding. Wear sanitary pads if needed. Do not douche or use tampons.     · Don't have sex until the doctor says it is okay. Follow-up care is a key part of your treatment and safety. Be sure to make and go to all appointments, and call your doctor if you are having problems. It's also a good idea to know your test results and keep a list of the medicines you take. When should you call for help? Call 911 anytime you think you may need emergency care.  For example, call if:    · You passed out (lost consciousness).     · You have chest pain, are short of breath, or cough up blood.    Call your doctor now or seek immediate medical care if:    · You have pain that does not get better after you take pain medicine.     · You cannot pass stools or gas.     · You have vaginal discharge that has increased in amount or smells bad.     · You are sick to your stomach or cannot drink fluids.     · You have loose stitches, or your incision comes open.     · Bright red blood has soaked through the bandage over your incision.     · You have signs of infection, such as:  ? Increased pain, swelling, warmth, or redness. ? Red streaks leading from the incision. ? Pus draining from the incision. ? A fever.     · You have bright red vaginal bleeding that soaks one or more pads in an hour, or you have large clots.     · You have signs of a blood clot in your leg (called a deep vein thrombosis), such as:  ? Pain in your calf, back of the knee, thigh, or groin. ? Redness and swelling in your leg or groin.    Watch closely for changes in your health, and be sure to contact your doctor if you have any problems. Where can you learn more? Go to http://kirk-keila.info/. Enter Q131 in the search box to learn more about \"Laparoscopic Hysterectomy: What to Expect at Home. \"  Current as of: February 19, 2019  Content Version: 12.2  © 8266-1402 [employer]. Care instructions adapted under license by [employer] (which disclaims liability or warranty for this information). If you have questions about a medical condition or this instruction, always ask your healthcare professional. [address] any warranty or liability for your use of this information.

## 2020-02-21 NOTE — ANESTHESIA POSTPROCEDURE EVALUATION
Procedure(s):  DAVINCI TOTAL LAPAROSCOPIC HYSTERECTOMY/POSSIBLE BILATERAL SALPINGO OOPHRORECTOMY. general    <BSHSIANPOST>    Vitals Value Taken Time   /81 2/19/2020 12:45 PM   Temp 36.6 °C (97.8 °F) 2/19/2020 10:44 AM   Pulse 85 2/19/2020 12:46 PM   Resp 17 2/19/2020 12:46 PM   SpO2 94 % 2/19/2020 12:46 PM   Vitals shown include unvalidated device data.

## 2020-03-02 ENCOUNTER — HOSPITAL ENCOUNTER (OUTPATIENT)
Dept: MAMMOGRAPHY | Age: 45
Discharge: HOME | End: 2020-03-02
Attending: SPECIALIST
Payer: MEDICAID

## 2020-03-02 DIAGNOSIS — Z12.31 VISIT FOR SCREENING MAMMOGRAM: ICD-10-CM

## 2020-03-02 PROCEDURE — 77067 SCR MAMMO BI INCL CAD: CPT

## 2021-01-17 ENCOUNTER — HOSPITAL ENCOUNTER (EMERGENCY)
Age: 46
Discharge: HOME | End: 2021-01-17
Attending: EMERGENCY MEDICINE
Payer: MEDICAID

## 2021-01-17 VITALS
OXYGEN SATURATION: 100 % | BODY MASS INDEX: 30.68 KG/M2 | RESPIRATION RATE: 16 BRPM | HEART RATE: 81 BPM | TEMPERATURE: 98 F | HEIGHT: 61 IN | DIASTOLIC BLOOD PRESSURE: 87 MMHG | WEIGHT: 162.48 LBS | SYSTOLIC BLOOD PRESSURE: 122 MMHG

## 2021-01-17 DIAGNOSIS — B34.9 VIRAL SYNDROME: Primary | ICD-10-CM

## 2021-01-17 DIAGNOSIS — R09.81 SINUS CONGESTION: ICD-10-CM

## 2021-01-17 PROCEDURE — 99283 EMERGENCY DEPT VISIT LOW MDM: CPT

## 2021-01-17 PROCEDURE — 87635 SARS-COV-2 COVID-19 AMP PRB: CPT

## 2021-01-17 RX ORDER — BENZONATATE 100 MG/1
200 CAPSULE ORAL
Qty: 30 CAP | Refills: 0 | Status: SHIPPED | OUTPATIENT
Start: 2021-01-17 | End: 2021-01-24

## 2021-01-17 RX ORDER — BENZONATATE 100 MG/1
200 CAPSULE ORAL
Qty: 30 CAP | Refills: 0 | Status: SHIPPED | OUTPATIENT
Start: 2021-01-17 | End: 2021-01-17 | Stop reason: SDUPTHER

## 2021-01-17 NOTE — ED PROVIDER NOTES
Date of Service:  2021    Patient:  Earline Isaacs    Chief Complaint:  Nasal Congestion       HPI:  Earline Isaacs is a 39 y.o.  female who presents for evaluation of sinus congestion, cough, fatigue and some exertional dyspnea. Patient denies any fevers. She denies chest pain. She has also a sore throat and some sinus pressure. Symptoms have been present for about 3 days. She denies known exposure to Covid. Otherwise no abdominal pain or chest pain. No nausea or vomiting. No dysuria or problems with her bowel movements           Past Medical History:   Diagnosis Date    ETOH abuse     FIFTH LIQUOR DAILY; 2-3 YEARS    Hepatic steatosis 2020    Severe    Hypertension     NOT CURRENTLY TAKING BP MEDS, STOPPED 2019.  Ill-defined condition     SITUATIONAL ANXIETY AND DEPRESSION; NOT CURRENTLY TAKING MEDICATION.     Pancreatitis     HOSPITALIZED       Past Surgical History:   Procedure Laterality Date    HX  SECTION      x2    HX CHOLECYSTECTOMY      HX GYN  2019    ENDOMETRIAL ABLATION    HX HYSTERECTOMY      HX TUBAL LIGATION           Family History:   Problem Relation Age of Onset    Diabetes Mother     Heart Disease Mother     Hypertension Mother     Alzheimer Mother    Danielle [place] Schizophrenia Mother     Anesth Problems Neg Hx        Social History     Socioeconomic History    Marital status:      Spouse name:     Number of children: Not on file    Years of education: Not on file    Highest education level: Not on file   Occupational History    Not on file   Social Needs    Financial resource strain: Not on file    Food insecurity     Worry: Not on file     Inability: Not on file    Transportation needs     Medical: Not on file     Non-medical: Not on file   Tobacco Use    Smoking status: Current Every Day Smoker     Packs/day: 1.50     Years: 30.00     Pack years: 45.00     Types: Cigarettes     Start date: 1999    Smokeless tobacco: Never Used   Substance and Sexual Activity    Alcohol use: Yes     Comment: FIFTH OF LIQUOR DAILY    Drug use: No    Sexual activity: Yes     Partners: Male     Comment:    Lifestyle    Physical activity     Days per week: Not on file     Minutes per session: Not on file    Stress: Not on file   Relationships    Social connections     Talks on phone: Not on file     Gets together: Not on file     Attends [employer] service: Not on file     Active member of club or organization: Not on file     Attends meetings of clubs or organizations: Not on file     Relationship status: Not on file    Intimate partner violence     Fear of current or ex partner: Not on file     Emotionally abused: Not on file     Physically abused: Not on file     Forced sexual activity: Not on file   Other Topics Concern    Not on file   Social History Narrative    Works as / at [address] [place] [address]: Patient has no known allergies. Review of Systems   Constitutional: Positive for fatigue. Negative for fever. HENT: Positive for sore throat. Negative for hearing loss. Eyes: Negative for visual disturbance. Respiratory: Positive for cough. Negative for shortness of breath. Cardiovascular: Negative for chest pain. Gastrointestinal: Negative for abdominal pain, diarrhea, nausea and vomiting. Genitourinary: Negative for dysuria and flank pain. Musculoskeletal: Positive for myalgias. Negative for back pain. Skin: Negative for rash. Neurological: Negative for dizziness and light-headedness. Psychiatric/Behavioral: Negative for confusion. Vitals:    01/17/21 1102   BP: 132/84   Pulse: 85   Resp: 16   Temp: 98 °F (36.7 °C)   SpO2: 100%   Weight: 73.7 kg (162 lb 7.7 oz)   Height: 5' 1\" (1.549 m)            Physical Exam  Vitals signs and nursing note reviewed. Constitutional:       General: She is not in acute distress. Appearance: Normal appearance. She is well-developed. HENT:      Head: Normocephalic and atraumatic. Right Ear: Tympanic membrane, ear canal and external ear normal. There is no impacted cerumen. Left Ear: Tympanic membrane, ear canal and external ear normal. There is no impacted cerumen. Nose: Nose normal. No congestion or rhinorrhea. Mouth/Throat:      Mouth: Mucous membranes are moist.      Pharynx: No oropharyngeal exudate or posterior oropharyngeal erythema. Eyes:      General: No scleral icterus. Right eye: No discharge. Extraocular Movements: Extraocular movements intact. Conjunctiva/sclera: Conjunctivae normal.   Neck:      Musculoskeletal: Neck supple. Vascular: No JVD. Trachea: No tracheal deviation. Cardiovascular:      Rate and Rhythm: Normal rate and regular rhythm. Heart sounds: No murmur. Pulmonary:      Effort: Pulmonary effort is normal. No respiratory distress. Breath sounds: Normal breath sounds. Abdominal:      General: There is no distension. Palpations: Abdomen is soft. Musculoskeletal:      Right lower leg: No edema. Left lower leg: No edema. Skin:     General: Skin is warm. Capillary Refill: Capillary refill takes less than 2 seconds. Neurological:      Mental Status: She is alert and oriented to person, place, and time. Psychiatric:         Mood and Affect: Mood normal.         Behavior: Behavior normal.          MDM  Number of Diagnoses or Management Options       VITAL SIGNS:  Patient Vitals for the past 4 hrs:   Temp Pulse Resp BP SpO2   01/17/21 1102 98 °F (36.7 °C) 85 16 132/84 100 %         LABS:  No results found for this or any previous visit (from the past 6 hour(s)). IMAGING:  No orders to display         Medications During Visit:  Medications - No data to display      DECISION MAKING:  Elina Benito is a 39 y.o. female who comes in as above. Here, patient feels well. Her exam is grossly unremarkable.   Patient's story is concerning for possible coronavirus so testing is ordered. I will prescribe her Tessalon for her cough and have her follow with her PCP and we discussed other over-the-counter medicines. Patient agreeable to plan. All questions answered. IMPRESSION:  1. Viral syndrome    2. Sinus congestion        DISPOSITION:  Discharged      Current Discharge Medication List      START taking these medications    Details   benzonatate (Tessalon Perles) 100 mg capsule Take 2 Caps by mouth three (3) times daily as needed for Cough for up to 7 days. Qty: 30 Cap, Refills: 0              Follow-up Information     Follow up With Specialties Details Why Contact Info    Celia Lopez, NP Nurse Practitioner Schedule an appointment as soon as possible for a visit   [address] [place] [address]  [phone]              The patient is asked to follow-up with their primary care provider in the next several days. They are to call tomorrow for an appointment. The patient is asked to return promptly for any increased concerns or worsening of symptoms. They can return to this emergency department or any other emergency department.     Procedures

## 2021-01-17 NOTE — Clinical Note
[address] 
[facility] 
[address] [address] [phone] Work/School Note Date: 1/17/2021 To Whom It May concern: 
 
Breann Fitzgerald was seen and treated today in the emergency room by the following provider(s): 
Attending Provider: Jackelyn Tate DO. Breann Fitzgerald is excused from work/school on 01/17/21 and 01/18/21. She is medically clear to return to work/school on 1/19/2021. Sincerely, Stuart Frias DO

## 2021-01-17 NOTE — ED TRIAGE NOTES
Pt arrives with cold like symptoms. + nasal congestion and \"just not feeling well\". Denies fever at home.

## 2021-01-18 LAB
COVID-19, XGCOVT: NOT DETECTED
SOURCE, COVRS: NORMAL
SPECIMEN SOURCE, FCOV2M: NORMAL
SPECIMEN TYPE, XMCV1T: NORMAL

## 2021-01-25 ENCOUNTER — APPOINTMENT (OUTPATIENT)
Dept: ULTRASOUND IMAGING | Age: 46
End: 2021-01-25
Attending: EMERGENCY MEDICINE
Payer: MEDICAID

## 2021-01-25 ENCOUNTER — APPOINTMENT (OUTPATIENT)
Dept: CT IMAGING | Age: 46
End: 2021-01-25
Attending: EMERGENCY MEDICINE
Payer: MEDICAID

## 2021-01-25 ENCOUNTER — HOSPITAL ENCOUNTER (EMERGENCY)
Age: 46
Discharge: HOME | End: 2021-01-25
Attending: EMERGENCY MEDICINE
Payer: MEDICAID

## 2021-01-25 VITALS
RESPIRATION RATE: 16 BRPM | DIASTOLIC BLOOD PRESSURE: 82 MMHG | HEART RATE: 82 BPM | SYSTOLIC BLOOD PRESSURE: 121 MMHG | TEMPERATURE: 98 F | HEIGHT: 61 IN | OXYGEN SATURATION: 99 % | BODY MASS INDEX: 31.3 KG/M2 | WEIGHT: 165.79 LBS

## 2021-01-25 DIAGNOSIS — R10.813 RIGHT LOWER QUADRANT ABDOMINAL TENDERNESS WITHOUT REBOUND TENDERNESS: Primary | ICD-10-CM

## 2021-01-25 LAB
ALBUMIN SERPL-MCNC: 3.1 G/DL (ref 3.5–5)
ALBUMIN/GLOB SERPL: 0.8 {RATIO} (ref 1.1–2.2)
ALP SERPL-CCNC: 95 U/L (ref 45–117)
ALT SERPL-CCNC: 48 U/L (ref 12–78)
ANION GAP SERPL CALC-SCNC: 15 MMOL/L (ref 5–15)
APPEARANCE UR: CLEAR
AST SERPL-CCNC: 51 U/L (ref 15–37)
BACTERIA URNS QL MICRO: NEGATIVE /HPF
BASOPHILS # BLD: 0.1 K/UL (ref 0–0.1)
BASOPHILS NFR BLD: 1 % (ref 0–1)
BILIRUB SERPL-MCNC: 0.3 MG/DL (ref 0.2–1)
BILIRUB UR QL: NEGATIVE
BUN SERPL-MCNC: 11 MG/DL (ref 6–20)
BUN/CREAT SERPL: 9 (ref 12–20)
CALCIUM SERPL-MCNC: 8.3 MG/DL (ref 8.5–10.1)
CHLORIDE SERPL-SCNC: 101 MMOL/L (ref 97–108)
CO2 SERPL-SCNC: 23 MMOL/L (ref 21–32)
COLOR UR: NORMAL
CREAT SERPL-MCNC: 1.18 MG/DL (ref 0.55–1.02)
DIFFERENTIAL METHOD BLD: ABNORMAL
EOSINOPHIL # BLD: 0.1 K/UL (ref 0–0.4)
EOSINOPHIL NFR BLD: 1 % (ref 0–7)
EPITH CASTS URNS QL MICRO: NORMAL /LPF
ERYTHROCYTE [DISTWIDTH] IN BLOOD BY AUTOMATED COUNT: 11.9 % (ref 11.5–14.5)
GLOBULIN SER CALC-MCNC: 4.1 G/DL (ref 2–4)
GLUCOSE SERPL-MCNC: 140 MG/DL (ref 65–100)
GLUCOSE UR STRIP.AUTO-MCNC: NEGATIVE MG/DL
HCT VFR BLD AUTO: 39.1 % (ref 35–47)
HGB BLD-MCNC: 13.3 G/DL (ref 11.5–16)
HGB UR QL STRIP: NEGATIVE
IMM GRANULOCYTES # BLD AUTO: 0.1 K/UL (ref 0–0.04)
IMM GRANULOCYTES NFR BLD AUTO: 1 % (ref 0–0.5)
KETONES UR QL STRIP.AUTO: NEGATIVE MG/DL
LEUKOCYTE ESTERASE UR QL STRIP.AUTO: NEGATIVE
LIPASE SERPL-CCNC: 160 U/L (ref 73–393)
LYMPHOCYTES # BLD: 3.1 K/UL (ref 0.8–3.5)
LYMPHOCYTES NFR BLD: 28 % (ref 12–49)
MCH RBC QN AUTO: 34.2 PG (ref 26–34)
MCHC RBC AUTO-ENTMCNC: 34 G/DL (ref 30–36.5)
MCV RBC AUTO: 100.5 FL (ref 80–99)
MONOCYTES # BLD: 0.6 K/UL (ref 0–1)
MONOCYTES NFR BLD: 5 % (ref 5–13)
NEUTS SEG # BLD: 7.1 K/UL (ref 1.8–8)
NEUTS SEG NFR BLD: 64 % (ref 32–75)
NITRITE UR QL STRIP.AUTO: NEGATIVE
NRBC # BLD: 0 K/UL (ref 0–0.01)
NRBC BLD-RTO: 0 PER 100 WBC
PH UR STRIP: 6 [PH] (ref 5–8)
PLATELET # BLD AUTO: 162 K/UL (ref 150–400)
PMV BLD AUTO: 12.3 FL (ref 8.9–12.9)
POTASSIUM SERPL-SCNC: 4.5 MMOL/L (ref 3.5–5.1)
PROT SERPL-MCNC: 7.2 G/DL (ref 6.4–8.2)
PROT UR STRIP-MCNC: NEGATIVE MG/DL
RBC # BLD AUTO: 3.89 M/UL (ref 3.8–5.2)
RBC #/AREA URNS HPF: NORMAL /HPF (ref 0–5)
SODIUM SERPL-SCNC: 139 MMOL/L (ref 136–145)
SP GR UR REFRACTOMETRY: 1.01 (ref 1–1.03)
UA: UC IF INDICATED,UAUC: NORMAL
UROBILINOGEN UR QL STRIP.AUTO: 0.2 EU/DL (ref 0.2–1)
WBC # BLD AUTO: 11 K/UL (ref 3.6–11)
WBC URNS QL MICRO: NORMAL /HPF (ref 0–4)

## 2021-01-25 PROCEDURE — 99283 EMERGENCY DEPT VISIT LOW MDM: CPT

## 2021-01-25 PROCEDURE — 96374 THER/PROPH/DIAG INJ IV PUSH: CPT

## 2021-01-25 PROCEDURE — 81001 URINALYSIS AUTO W/SCOPE: CPT

## 2021-01-25 PROCEDURE — 80053 COMPREHEN METABOLIC PANEL: CPT

## 2021-01-25 PROCEDURE — 83690 ASSAY OF LIPASE: CPT

## 2021-01-25 PROCEDURE — 96375 TX/PRO/DX INJ NEW DRUG ADDON: CPT

## 2021-01-25 PROCEDURE — 74177 CT ABD & PELVIS W/CONTRAST: CPT

## 2021-01-25 PROCEDURE — 76857 US EXAM PELVIC LIMITED: CPT

## 2021-01-25 PROCEDURE — 85025 COMPLETE CBC W/AUTO DIFF WBC: CPT

## 2021-01-25 PROCEDURE — 74011250636 HC RX REV CODE- 250/636: Performed by: EMERGENCY MEDICINE

## 2021-01-25 PROCEDURE — 76830 TRANSVAGINAL US NON-OB: CPT

## 2021-01-25 PROCEDURE — 74011000636 HC RX REV CODE- 636: Performed by: EMERGENCY MEDICINE

## 2021-01-25 RX ORDER — ONDANSETRON 2 MG/ML
4 INJECTION INTRAMUSCULAR; INTRAVENOUS ONCE
Status: COMPLETED | OUTPATIENT
Start: 2021-01-25 | End: 2021-01-25

## 2021-01-25 RX ORDER — SODIUM CHLORIDE 9 MG/ML
50 INJECTION, SOLUTION INTRAVENOUS
Status: COMPLETED | OUTPATIENT
Start: 2021-01-25 | End: 2021-01-25

## 2021-01-25 RX ORDER — SODIUM CHLORIDE 0.9 % (FLUSH) 0.9 %
10 SYRINGE (ML) INJECTION
Status: COMPLETED | OUTPATIENT
Start: 2021-01-25 | End: 2021-01-25

## 2021-01-25 RX ORDER — KETOROLAC TROMETHAMINE 30 MG/ML
30 INJECTION, SOLUTION INTRAMUSCULAR; INTRAVENOUS
Status: COMPLETED | OUTPATIENT
Start: 2021-01-25 | End: 2021-01-25

## 2021-01-25 RX ADMIN — ONDANSETRON 4 MG: 2 INJECTION INTRAMUSCULAR; INTRAVENOUS at 13:27

## 2021-01-25 RX ADMIN — IOPAMIDOL 100 ML: 755 INJECTION, SOLUTION INTRAVENOUS at 13:57

## 2021-01-25 RX ADMIN — KETOROLAC TROMETHAMINE 30 MG: 30 INJECTION, SOLUTION INTRAMUSCULAR at 13:27

## 2021-01-25 RX ADMIN — Medication 10 ML: at 13:57

## 2021-01-25 RX ADMIN — SODIUM CHLORIDE 50 ML/HR: 900 INJECTION, SOLUTION INTRAVENOUS at 13:58

## 2021-01-25 NOTE — ED PROVIDER NOTES
Date of Service:  2021    Patient:  Traci Lima    Chief Complaint:  Groin Pain       HPI:  Traci Lima is a 39 y.o.  female who presents for evaluation of right lower quadrant abdominal pain. Started abruptly yesterday morning, 4-10, presently an 8 out of 10 nonradiating discomfort. Worse with coughing and bowel movements. No diarrhea or constipation. No dysuria. History of total hysterectomy. Patient continues to pass gas. Nausea without vomiting. No history of appendectomy. Otherwise patient denies any other acute complaints specifically denying any vaginal complaints           Past Medical History:   Diagnosis Date    ETOH abuse     FIFTH LIQUOR DAILY; 2-3 YEARS    Hepatic steatosis 2020    Severe    Hypertension     NOT CURRENTLY TAKING BP MEDS, STOPPED 2019.  Ill-defined condition     SITUATIONAL ANXIETY AND DEPRESSION; NOT CURRENTLY TAKING MEDICATION.     Pancreatitis     HOSPITALIZED       Past Surgical History:   Procedure Laterality Date    HX  SECTION      x2    HX CHOLECYSTECTOMY      HX GYN  2019    ENDOMETRIAL ABLATION    HX HYSTERECTOMY      HX TUBAL LIGATION           Family History:   Problem Relation Age of Onset    Diabetes Mother     Heart Disease Mother     Hypertension Mother     Alzheimer Mother    [place] Sic[place] Schizophrenia Mother     Anesth Problems Neg Hx        Social History     Socioeconomic History    Marital status:      Spouse name:     Number of children: Not on file    Years of education: Not on file    Highest education level: Not on file   Occupational History    Not on file   Social Needs    Financial resource strain: Not on file    Food insecurity     Worry: Not on file     Inability: Not on file   [employer] needs     Medical: Not on file     Non-medical: Not on file   Tobacco Use    Smoking status: Current Every Day Smoker     Packs/day: 1.50     Years: 30.00     Pack years: 45.00     Types: Cigarettes     Start date: 1/1/1999    Smokeless tobacco: Never Used   Substance and Sexual Activity    Alcohol use: Yes     Comment: 5 shots 3-4 times a week    Drug use: No    Sexual activity: Yes     Partners: Male     Comment:    Lifestyle    Physical activity     Days per week: Not on file     Minutes per session: Not on file    Stress: Not on file   Relationships    Social connections     Talks on phone: Not on file     Gets together: Not on file     Attends Shinto service: Not on file     Active member of club or organization: Not on file     Attends meetings of clubs or organizations: Not on file     Relationship status: Not on file    Intimate partner violence     Fear of current or ex partner: Not on file     Emotionally abused: Not on file     Physically abused: Not on file     Forced sexual activity: Not on file   Other Topics Concern    Not on file   Social History Narrative    Works as / 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 HENT: Negative for hearing loss. Eyes: Negative for visual disturbance. Respiratory: Negative for shortness of breath. Cardiovascular: Negative for chest pain. Gastrointestinal: Positive for abdominal pain and nausea. Negative for vomiting. Genitourinary: Negative for dysuria, flank pain, vaginal bleeding, vaginal discharge and vaginal pain. Musculoskeletal: Negative for back pain. Skin: Negative for rash. Neurological: Negative for dizziness and light-headedness. Psychiatric/Behavioral: Negative for confusion. Vitals:    01/25/21 1316   BP: (!) 144/93   Pulse: 88   Resp: 16   Temp: 98.2 °F (36.8 °C)   SpO2: 98%   Weight: 75.2 kg (165 lb 12.6 oz)   Height: 5' 1\" (1.549 m)            Physical Exam  Vitals signs and nursing note reviewed. Constitutional:       General: She is not in acute distress. Appearance: She is well-developed. She is not ill-appearing, toxic-appearing or diaphoretic. HENT:      Head: Normocephalic and atraumatic. Eyes:      General: No scleral icterus. Conjunctiva/sclera: Conjunctivae normal.      Pupils: Pupils are equal, round, and reactive to light. Neck:      Musculoskeletal: Neck supple. Vascular: No JVD. Trachea: No tracheal deviation. Cardiovascular:      Rate and Rhythm: Normal rate and regular rhythm. Heart sounds: No murmur. No gallop. Pulmonary:      Effort: Pulmonary effort is normal. No respiratory distress. Breath sounds: Normal breath sounds. Abdominal:      General: There is no distension. Palpations: Abdomen is soft. Tenderness: There is abdominal tenderness. There is no right CVA tenderness or left CVA tenderness. Positive signs include McBurney's sign and obturator sign. Negative signs include Quinn's sign and psoas sign. Musculoskeletal: Normal range of motion. General: No tenderness or deformity. Skin:     General: Skin is warm and dry. Capillary Refill: Capillary refill takes less than 2 seconds. Findings: No rash. Neurological:      Mental Status: She is alert and oriented to person, place, and time. Psychiatric:         Behavior: Behavior normal.         Thought Content:  Thought content normal.         Judgment: Judgment normal.          MDM        VITAL SIGNS:  Patient Vitals for the past 4 hrs:   Temp Pulse Resp BP SpO2   01/25/21 1330 -- -- -- 126/82 95 %   01/25/21 1316 98.2 °F (36.8 °C) 88 16 (!) 144/93 98 %         LABS:  Recent Results (from the past 6 hour(s))   CBC WITH AUTOMATED DIFF    Collection Time: 01/25/21  1:24 PM   Result Value Ref Range    WBC 11.0 3.6 - 11.0 K/uL    RBC 3.89 3.80 - 5.20 M/uL    HGB 13.3 11.5 - 16.0 g/dL    HCT 39.1 35.0 - 47.0 %    .5 (H) 80.0 - 99.0 FL    MCH 34.2 (H) 26.0 - 34.0 PG    MCHC 34.0 30.0 - 36.5 g/dL    RDW 11.9 11.5 - 14.5 %    PLATELET 048 424 - 834 K/uL    MPV 12.3 8.9 - 12.9 FL    NRBC 0.0 0  WBC    ABSOLUTE NRBC 0.00 0.00 - 0.01 K/uL    NEUTROPHILS 64 32 - 75 %    LYMPHOCYTES 28 12 - 49 %    MONOCYTES 5 5 - 13 %    EOSINOPHILS 1 0 - 7 %    BASOPHILS 1 0 - 1 %    IMMATURE GRANULOCYTES 1 (H) 0.0 - 0.5 %    ABS. NEUTROPHILS 7.1 1.8 - 8.0 K/UL    ABS. LYMPHOCYTES 3.1 0.8 - 3.5 K/UL    ABS. MONOCYTES 0.6 0.0 - 1.0 K/UL    ABS. EOSINOPHILS 0.1 0.0 - 0.4 K/UL    ABS. BASOPHILS 0.1 0.0 - 0.1 K/UL    ABS. IMM. GRANS. 0.1 (H) 0.00 - 0.04 K/UL    DF AUTOMATED     METABOLIC PANEL, COMPREHENSIVE    Collection Time: 01/25/21  1:24 PM   Result Value Ref Range    Sodium 139 136 - 145 mmol/L    Potassium 4.5 3.5 - 5.1 mmol/L    Chloride 101 97 - 108 mmol/L    CO2 23 21 - 32 mmol/L    Anion gap 15 5 - 15 mmol/L    Glucose 140 (H) 65 - 100 mg/dL    BUN 11 6 - 20 MG/DL    Creatinine 1.18 (H) 0.55 - 1.02 MG/DL    BUN/Creatinine ratio 9 (L) 12 - 20      GFR est AA >60 >60 ml/min/1.73m2    GFR est non-AA 50 (L) >60 ml/min/1.73m2    Calcium 8.3 (L) 8.5 - 10.1 MG/DL    Bilirubin, total 0.3 0.2 - 1.0 MG/DL    ALT (SGPT) 48 12 - 78 U/L    AST (SGOT) 51 (H) 15 - 37 U/L    Alk.  phosphatase 95 45 - 117 U/L    Protein, total 7.2 6.4 - 8.2 g/dL    Albumin 3.1 (L) 3.5 - 5.0 g/dL    Globulin 4.1 (H) 2.0 - 4.0 g/dL    A-G Ratio 0.8 (L) 1.1 - 2.2     URINALYSIS W/ REFLEX CULTURE    Collection Time: 01/25/21  1:24 PM    Specimen: Urine   Result Value Ref Range    Color YELLOW/STRAW      Appearance CLEAR CLEAR      Specific gravity 1.015 1.003 - 1.030      pH (UA) 6.0 5.0 - 8.0      Protein Negative NEG mg/dL    Glucose Negative NEG mg/dL    Ketone Negative NEG mg/dL    Bilirubin Negative NEG      Blood Negative NEG      Urobilinogen 0.2 0.2 - 1.0 EU/dL    Nitrites Negative NEG      Leukocyte Esterase Negative NEG      WBC 0-4 0 - 4 /hpf    RBC 0-5 0 - 5 /hpf    Epithelial cells FEW FEW /lpf    Bacteria Negative NEG /hpf    UA:UC IF INDICATED CULTURE NOT INDICATED BY UA RESULT     LIPASE    Collection Time: 01/25/21  1:24 PM   Result Value Ref Range    Lipase 160 73 - 393 U/L        IMAGING:  US TRANSVAGINAL   Final Result   1. Post hysterectomy. Small ovarian cysts         US PELV NON OBS  LTD   Final Result   1. Post hysterectomy. Small ovarian cysts         CT ABD PELV W CONT   Final Result   Normal appendix. Hepatic steatosis. Status post hysterectomy with bilateral   ovarian cysts. Sigmoid diverticulosis. No acute abnormality. Medications During Visit:  Medications   ketorolac (TORADOL) injection 30 mg (30 mg IntraVENous Given 1/25/21 1327)   ondansetron (ZOFRAN) injection 4 mg (4 mg IntraVENous Given 1/25/21 1327)   0.9% sodium chloride infusion (50 mL/hr IntraVENous Bolus 1/25/21 1358)   iopamidoL (ISOVUE-370) 76 % injection 100 mL (100 mL IntraVENous Given 1/25/21 1357)   sodium chloride (NS) flush 10 mL (10 mL IntraVENous Given 1/25/21 1357)         DECISION MAKING:  Elbert Han is a 39 y.o. female who comes in as above. Diagnostics as above. Patient the patient's location of pain is possible that she could have pulled something when she stood up and twisted, her work-up is grossly unremarkable. Patient be discharged home with PCP follow-up information and return instructions. She is agreeable to the plan. All questions answered. IMPRESSION:  1. Right lower quadrant abdominal tenderness without rebound tenderness        DISPOSITION:  Discharged      Current Discharge Medication List           Follow-up Information     Follow up With Specialties Details Why Contact Adrian Bird NP Nurse Practitioner Schedule an appointment as soon as possible for a visit   [address] [place] [address]  [phone]              The patient is asked to follow-up with their primary care provider in the next several days. They are to call tomorrow for an appointment. The patient is asked to return promptly for any increased concerns or worsening of symptoms.   They can return to this emergency department or any other emergency department.       Procedures

## 2021-01-25 NOTE — ED TRIAGE NOTES
Pt c/o right groin pain that started yesterday, pt denies any injury.  Pt states that she is nauseous but no diarrhea

## 2021-12-27 ENCOUNTER — APPOINTMENT (OUTPATIENT)
Dept: GENERAL RADIOLOGY | Age: 46
End: 2021-12-27
Attending: EMERGENCY MEDICINE
Payer: MEDICAID

## 2021-12-27 ENCOUNTER — HOSPITAL ENCOUNTER (EMERGENCY)
Age: 46
Discharge: HOME | End: 2021-12-27
Attending: EMERGENCY MEDICINE
Payer: MEDICAID

## 2021-12-27 VITALS
RESPIRATION RATE: 16 BRPM | WEIGHT: 169 LBS | HEIGHT: 61 IN | HEART RATE: 75 BPM | DIASTOLIC BLOOD PRESSURE: 81 MMHG | TEMPERATURE: 98.1 F | BODY MASS INDEX: 31.91 KG/M2 | SYSTOLIC BLOOD PRESSURE: 141 MMHG | OXYGEN SATURATION: 98 %

## 2021-12-27 DIAGNOSIS — Z20.822 SUSPECTED COVID-19 VIRUS INFECTION: Primary | ICD-10-CM

## 2021-12-27 LAB
ATRIAL RATE: 74 BPM
CALCULATED P AXIS, ECG09: 45 DEGREES
CALCULATED R AXIS, ECG10: -10 DEGREES
CALCULATED T AXIS, ECG11: 0 DEGREES
DIAGNOSIS, 93000: NORMAL
P-R INTERVAL, ECG05: 146 MS
Q-T INTERVAL, ECG07: 416 MS
QRS DURATION, ECG06: 74 MS
QTC CALCULATION (BEZET), ECG08: 461 MS
SARS-COV-2, COV2: NORMAL
VENTRICULAR RATE, ECG03: 74 BPM

## 2021-12-27 PROCEDURE — 99282 EMERGENCY DEPT VISIT SF MDM: CPT

## 2021-12-27 PROCEDURE — U0005 INFEC AGEN DETEC AMPLI PROBE: HCPCS

## 2021-12-27 PROCEDURE — 93005 ELECTROCARDIOGRAM TRACING: CPT

## 2021-12-27 PROCEDURE — 71045 X-RAY EXAM CHEST 1 VIEW: CPT

## 2021-12-27 NOTE — Clinical Note
[facility]  [address]  [address]  [phone]    Work/School Note    Date: 12/27/2021     To Whom It May concern:    Margoth Banegas was evaluated by the following provider(s):  Attending Provider: Miranda Figueroa MD.   Maia Fallston virus is suspected. Per the CDC guidelines we recommend home isolation until the following conditions are all met:    1. At least 10 days have passed since symptoms first appeared and  2. At least 24 hours have passed since last fever without the use of fever-reducing medications and  3. Symptoms (e.g., cough, shortness of breath) have improved      Sincerely,          Sylvester Serna MD

## 2021-12-27 NOTE — Clinical Note
[facility]  [address]  [address]  [phone]    Work/School Note    Date: 12/27/2021     To Whom It May concern:    Margoth Escalante was evaluated by the following provider(s):  Attending Provider: Caroline Valencia MD.   Dante Ales virus is suspected. Per the CDC guidelines we recommend home isolation until the following conditions are all met:    1. At least 10 days have passed since symptoms first appeared and  2. At least 24 hours have passed since last fever without the use of fever-reducing medications and  3. Symptoms (e.g., cough, shortness of breath) have improved      Sincerely,          Sylvester Durbin MD

## 2021-12-27 NOTE — ED TRIAGE NOTES
Pt reports headache, fever and body aches x 6 days. Pt reports fever as high as 1013F. Pt denies being tested for COVID or being vaccinated for COVID. Pt reports chest congestion and productive cough x6 days. carried

## 2021-12-27 NOTE — ED PROVIDER NOTES
59-year-old female who is unvaccinated for Covid presents with headache, fever, body aches, nasal congestion. She has been exposed to individuals with Covid. Her T-max at home was 101.3. She complains of chest congestion and pressure. Her symptoms been ongoing for the past 6 days. She denies any GI symptoms. She denies any urinary symptoms. She denies any current pain. She also is not vaccinated for flu. She has lost her sense of taste and smell. Past Medical History:   Diagnosis Date    ETOH abuse     FIFTH LIQUOR DAILY; 2-3 YEARS    Hepatic steatosis 2020    Severe    Hypertension     NOT CURRENTLY TAKING BP MEDS, STOPPED 2019.  Ill-defined condition     SITUATIONAL ANXIETY AND DEPRESSION; NOT CURRENTLY TAKING MEDICATION.     Pancreatitis     HOSPITALIZED       Past Surgical History:   Procedure Laterality Date    HX  SECTION      x2    HX CHOLECYSTECTOMY      HX GYN  2019    ENDOMETRIAL ABLATION    HX HYSTERECTOMY      HX TUBAL LIGATION           Family History:   Problem Relation Age of Onset    Diabetes Mother     Heart Disease Mother     Hypertension Mother     Alzheimer's Disease Mother     Schizophrenia Mother     Anesth Problems Neg Hx        Social History     Socioeconomic History    Marital status:      Spouse name:     Number of children: Not on file    Years of education: Not on file    Highest education level: Not on file   Occupational History    Not on file   Tobacco Use    Smoking status: Current Every Day Smoker     Packs/day: 1.50     Years: 30.00     Pack years: 45.00     Types: Cigarettes     Start date: 1999    Smokeless tobacco: Never Used   Substance and Sexual Activity    Alcohol use: Yes     Comment: 5 shots 3-4 times a week    Drug use: No    Sexual activity: Yes     Partners: Male     Comment:    Other Topics Concern    Not on file   Social History Narrative    Works as / at [employer] and [employer]      Social Determinants of Health     Financial Resource Strain:     Difficulty of Paying Living Expenses: Not on file   Food Insecurity:     Worried About Running Out of Food in the Last Year: Not on file    Clau of Food in the Last Year: Not on file   Transportation Needs:     Lack of Transportation (Medical): Not on file    Lack of Transportation (Non-Medical): Not on file   Physical Activity:     Days of Exercise per Week: Not on file    Minutes of Exercise per Session: Not on file   Stress:     Feeling of Stress : Not on file   Social Connections:     Frequency of Communication with Friends and Family: Not on file    Frequency of Social Gatherings with Friends and Family: Not on file    Attends Protestant Services: Not on file    Active Member of [address] [place] [address] [employer] or Organizations: Not on file    Attends Club or Organization Meetings: Not on file    Marital Status: Not on file   Intimate Partner Violence:     Fear of Current or Ex-Partner: Not on file    Emotionally Abused: Not on file    Physically Abused: Not on file    Sexually Abused: Not on file   Housing Stability:     Unable to Pay for Housing in the Last Year: Not on file    Number of [place] in the Last Year: Not on file    Unstable Housing in the Last Year: Not on file         ALLERGIES: Pcn [penicillins]    Review of Systems   All other systems reviewed and are negative. Vitals:    12/27/21 1048   BP: (!) 141/81   Pulse: 75   Resp: 16   Temp: 98.1 °F (36.7 °C)   SpO2: 98%   Weight: 76.7 kg (169 lb)   Height: 5' 1\" (1.549 m)            Physical Exam  Vitals and nursing note reviewed. Constitutional:       General: She is not in acute distress. HENT:      Head: Normocephalic and atraumatic. Mouth/Throat:      Mouth: Mucous membranes are moist.   Eyes:      General: No scleral icterus.      Conjunctiva/sclera: Conjunctivae normal.      Pupils: Pupils are equal, round, and reactive to light.   Neck:      Trachea: No tracheal deviation. Cardiovascular:      Rate and Rhythm: Normal rate and regular rhythm. Pulmonary:      Effort: Pulmonary effort is normal. No respiratory distress. Breath sounds: No wheezing or rales. Abdominal:      General: There is no distension. Palpations: Abdomen is soft. Tenderness: There is no abdominal tenderness. Genitourinary:     Comments: deferred  Musculoskeletal:         General: No deformity. Cervical back: Neck supple. Skin:     General: Skin is warm and dry. Neurological:      General: No focal deficit present. Mental Status: She is alert. Psychiatric:         Mood and Affect: Mood normal.          MDM  Number of Diagnoses or Management Options  Suspected COVID-19 virus infection  Diagnosis management comments: 59-year-old female presents with signs and symptoms of COVID-19. Her vital signs are normal.  She is afebrile. Will obtain EKG and chest x-ray given that she is having some chest pressure although cyst is likely secondary to congestion. Covid swab sent. Likely discharge home with isolation precautions. ED Course as of 12/27/21 1318   Mon Dec 27, 2021   1316 EKG shows normal sinus rhythm at rate of 74, normal intervals, normal axis, normal ST segments and T waves. [TT]      ED Course User Index  [TT] Joseph Gamble., MD       Procedures          LABORATORY TESTS:  Labs Reviewed   SARS-COV-2   SARS-COV-2       IMAGING RESULTS:  XR CHEST PORT   Final Result   Normal chest.          MEDICATIONS GIVEN:  Medications - No data to display    IMPRESSION:  1. Suspected COVID-19 virus infection        PLAN:  1. Current Discharge Medication List        2.    Follow-up Information     Follow up With Specialties Details Why Contact Sue Bird NP Nurse Practitioner Schedule an appointment as soon as possible for a visit   [address] [place] [address]  [phone]      [facility] Emergency Medicine  If symptoms worsen or new concerns [address]        3. Return to ED for new or worsening symptoms       Michael Campos. Evans Self MD        Please note that this dictation was completed with [employer], the computer voice recognition software. Quite often unanticipated grammatical, syntax, homophones, and other interpretive errors are inadvertently transcribed by the computer software. Please disregard these errors. Please excuse any errors that have escaped final proofreading.

## 2021-12-28 LAB
SARS-COV-2, XPLCVT: DETECTED
SOURCE, COVRS: ABNORMAL

## 2022-03-18 PROBLEM — K76.0 HEPATIC STEATOSIS: Status: ACTIVE | Noted: 2020-02-13

## 2022-05-30 ENCOUNTER — HOSPITAL ENCOUNTER (EMERGENCY)
Age: 47
Discharge: LEFT BEFORE DISCHARGE | End: 2022-05-30
Payer: MEDICAID

## 2022-05-30 VITALS
OXYGEN SATURATION: 98 % | DIASTOLIC BLOOD PRESSURE: 93 MMHG | SYSTOLIC BLOOD PRESSURE: 130 MMHG | TEMPERATURE: 98.1 F | HEART RATE: 82 BPM | RESPIRATION RATE: 18 BRPM

## 2022-05-30 PROCEDURE — 75810000275 HC EMERGENCY DEPT VISIT NO LEVEL OF CARE
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
## 2022-10-16 ENCOUNTER — HOSPITAL ENCOUNTER (EMERGENCY)
Age: 47
Discharge: HOME | End: 2022-10-16
Attending: EMERGENCY MEDICINE
Payer: MEDICAID

## 2022-10-16 ENCOUNTER — APPOINTMENT (OUTPATIENT)
Dept: GENERAL RADIOLOGY | Age: 47
End: 2022-10-16
Attending: EMERGENCY MEDICINE
Payer: MEDICAID

## 2022-10-16 VITALS
DIASTOLIC BLOOD PRESSURE: 79 MMHG | SYSTOLIC BLOOD PRESSURE: 111 MMHG | WEIGHT: 154.76 LBS | HEIGHT: 61 IN | BODY MASS INDEX: 29.22 KG/M2 | HEART RATE: 78 BPM | TEMPERATURE: 98 F | RESPIRATION RATE: 15 BRPM | OXYGEN SATURATION: 97 %

## 2022-10-16 DIAGNOSIS — G56.02 CARPAL TUNNEL SYNDROME OF LEFT WRIST: Primary | ICD-10-CM

## 2022-10-16 PROCEDURE — 99283 EMERGENCY DEPT VISIT LOW MDM: CPT

## 2022-10-16 PROCEDURE — 73110 X-RAY EXAM OF WRIST: CPT

## 2022-10-16 PROCEDURE — 74011250637 HC RX REV CODE- 250/637: Performed by: EMERGENCY MEDICINE

## 2022-10-16 RX ORDER — LOSARTAN POTASSIUM 25 MG/1
25 TABLET ORAL DAILY
COMMUNITY

## 2022-10-16 RX ORDER — IBUPROFEN 600 MG/1
600 TABLET ORAL ONCE
Status: COMPLETED | OUTPATIENT
Start: 2022-10-16 | End: 2022-10-16

## 2022-10-16 RX ORDER — PHENOL/SODIUM PHENOLATE
20 AEROSOL, SPRAY (ML) MUCOUS MEMBRANE DAILY
COMMUNITY

## 2022-10-16 RX ORDER — HYDROCHLOROTHIAZIDE 25 MG/1
25 TABLET ORAL DAILY
COMMUNITY

## 2022-10-16 RX ORDER — METOPROLOL TARTRATE 50 MG/1
50 TABLET ORAL DAILY
COMMUNITY

## 2022-10-16 RX ADMIN — IBUPROFEN 600 MG: 600 TABLET ORAL at 12:49

## 2022-10-16 NOTE — ED NOTES
I have reviewed discharge instructions with the patient. The patient verbalized understanding. Copy of discharge instructions provided.

## 2022-10-16 NOTE — ED TRIAGE NOTES
Pt relates that she awoke this morning with left hand pain. No known injury or obvious deformity. Skin pwd. Strong radial pulse. Decreased ROM.

## 2022-10-16 NOTE — ED PROVIDER NOTES
51-year-old female with no significant past medical history presents with complaints of atraumatic left wrist, hand pain starting this morning. No pain medications taken prior to arrival.  Denies any recent illness, fever, chills, nausea, vomiting, chest pain, shortness of breath. Pmd-reidt  Regular tobacco and alcohol use  Denies drug use       Past Medical History:   Diagnosis Date    ETOH abuse     FIFTH LIQUOR DAILY; 2-3 YEARS    Hepatic steatosis 2020    Severe    Hypertension     NOT CURRENTLY TAKING BP MEDS, STOPPED 2019. Ill-defined condition     SITUATIONAL ANXIETY AND DEPRESSION; NOT CURRENTLY TAKING MEDICATION.     Pancreatitis     HOSPITALIZED       Past Surgical History:   Procedure Laterality Date    HX  SECTION      x2    HX CHOLECYSTECTOMY      HX GYN  2019    ENDOMETRIAL ABLATION    HX HYSTERECTOMY      HX TUBAL LIGATION           Family History:   Problem Relation Age of Onset    Diabetes Mother     Heart Disease Mother     Hypertension Mother     Alzheimer's Disease Mother     Schizophrenia Mother     Anesth Problems Neg Hx        Social History     Socioeconomic History    Marital status:      Spouse name:     Number of children: Not on file    Years of education: Not on file    Highest education level: Not on file   Occupational History    Not on file   Tobacco Use    Smoking status: Every Day     Packs/day: 1.00     Years: 30.00     Pack years: 30.00     Types: Cigarettes     Start date: 1999    Smokeless tobacco: Never   Substance and Sexual Activity    Alcohol use: Yes     Comment: daily 1/2 gallon liquor daily    Drug use: No    Sexual activity: Yes     Partners: Male     Comment:    Other Topics Concern    Not on file   Social History Narrative    Works as / at [employer]      Social Determinants of Health     Financial Resource Strain: Not on file   Food Insecurity: Not on file   Transportation Needs: Not on file   Physical Activity: Not on file   Stress: Not on file   Social Connections: Not on file   Intimate Partner Violence: Not on file   Housing Stability: Not on file         ALLERGIES: Pcn [penicillins]    Review of Systems   Constitutional:  Negative for chills and fever. Respiratory:  Negative for cough and shortness of breath. Cardiovascular:  Negative for chest pain. Gastrointestinal:  Negative for abdominal pain, nausea and vomiting. Genitourinary:  Negative for dysuria and urgency. Musculoskeletal:  Positive for arthralgias. Skin:  Negative for wound. Neurological:  Negative for seizures and headaches. Vitals:    10/16/22 1213 10/16/22 1300   BP: 123/88 111/79   Pulse: 97 78   Resp: 16 15   Temp: 98 °F (36.7 °C)    SpO2: 97% 97%   Weight: 70.2 kg (154 lb 12.2 oz)    Height: 5' 1\" (1.549 m)             Physical Exam  Constitutional:       Appearance: She is well-developed. HENT:      Head: Normocephalic and atraumatic. Cardiovascular:      Rate and Rhythm: Normal rate. Pulmonary:      Effort: Pulmonary effort is normal. No respiratory distress. Musculoskeletal:         General: Normal range of motion. Right wrist: Normal.      Left wrist: No swelling, deformity, effusion, lacerations, tenderness, bony tenderness, snuff box tenderness or crepitus. Normal range of motion. Normal pulse. Cervical back: Normal range of motion. Skin:     General: Skin is warm and dry. Neurological:      Mental Status: She is alert and oriented to person, place, and time. MDM  Number of Diagnoses or Management Options  Carpal tunnel syndrome of left wrist  Diagnosis management comments: 80-year-old female presents with complaints of atraumatic left wrist pain. Well-appearing, no acute distress, hemodynamically stable, left wrist normal on exam, no swelling bony tenderness palpation step-offs or crepitus, normal range of motion. Plan-anti-inflammatory medication, x-ray. Clinical pression consistent with carpal tunnel syndrome.     X-ray unremarkable       Amount and/or Complexity of Data Reviewed  Tests in the radiology section of CPT®: ordered and reviewed           Procedures

## 2022-11-30 ENCOUNTER — HOSPITAL ENCOUNTER (EMERGENCY)
Age: 47
Discharge: HOME | End: 2022-11-30
Attending: EMERGENCY MEDICINE
Payer: MEDICAID

## 2022-11-30 ENCOUNTER — APPOINTMENT (OUTPATIENT)
Dept: CT IMAGING | Age: 47
End: 2022-11-30
Attending: EMERGENCY MEDICINE
Payer: MEDICAID

## 2022-11-30 ENCOUNTER — APPOINTMENT (OUTPATIENT)
Dept: GENERAL RADIOLOGY | Age: 47
End: 2022-11-30
Attending: EMERGENCY MEDICINE
Payer: MEDICAID

## 2022-11-30 VITALS
BODY MASS INDEX: 29.2 KG/M2 | RESPIRATION RATE: 14 BRPM | HEART RATE: 74 BPM | DIASTOLIC BLOOD PRESSURE: 85 MMHG | OXYGEN SATURATION: 94 % | TEMPERATURE: 98.3 F | WEIGHT: 154.54 LBS | SYSTOLIC BLOOD PRESSURE: 128 MMHG

## 2022-11-30 DIAGNOSIS — R07.89 ATYPICAL CHEST PAIN: Primary | ICD-10-CM

## 2022-11-30 LAB
ALBUMIN SERPL-MCNC: 3.1 G/DL (ref 3.5–5)
ALBUMIN/GLOB SERPL: 0.8 {RATIO} (ref 1.1–2.2)
ALP SERPL-CCNC: 80 U/L (ref 45–117)
ALT SERPL-CCNC: 37 U/L (ref 12–78)
ANION GAP SERPL CALC-SCNC: 13 MMOL/L (ref 5–15)
AST SERPL-CCNC: 49 U/L (ref 15–37)
ATRIAL RATE: 91 BPM
BASOPHILS # BLD: 0.1 K/UL (ref 0–0.1)
BASOPHILS NFR BLD: 1 % (ref 0–1)
BILIRUB SERPL-MCNC: 0.5 MG/DL (ref 0.2–1)
BUN SERPL-MCNC: 10 MG/DL (ref 6–20)
BUN/CREAT SERPL: 8 (ref 12–20)
CALCIUM SERPL-MCNC: 8.2 MG/DL (ref 8.5–10.1)
CALCULATED P AXIS, ECG09: 35 DEGREES
CALCULATED R AXIS, ECG10: -8 DEGREES
CALCULATED T AXIS, ECG11: 13 DEGREES
CHLORIDE SERPL-SCNC: 101 MMOL/L (ref 97–108)
CO2 SERPL-SCNC: 24 MMOL/L (ref 21–32)
CREAT SERPL-MCNC: 1.18 MG/DL (ref 0.55–1.02)
D DIMER PPP FEU-MCNC: 0.93 MG/L FEU (ref 0–0.65)
DIAGNOSIS, 93000: NORMAL
DIFFERENTIAL METHOD BLD: ABNORMAL
EOSINOPHIL # BLD: 0.3 K/UL (ref 0–0.4)
EOSINOPHIL NFR BLD: 3 % (ref 0–7)
ERYTHROCYTE [DISTWIDTH] IN BLOOD BY AUTOMATED COUNT: 12.2 % (ref 11.5–14.5)
GLOBULIN SER CALC-MCNC: 4 G/DL (ref 2–4)
GLUCOSE SERPL-MCNC: 161 MG/DL (ref 65–100)
HCT VFR BLD AUTO: 36.3 % (ref 35–47)
HGB BLD-MCNC: 12.1 G/DL (ref 11.5–16)
IMM GRANULOCYTES # BLD AUTO: 0 K/UL (ref 0–0.04)
IMM GRANULOCYTES NFR BLD AUTO: 0 % (ref 0–0.5)
LYMPHOCYTES # BLD: 2.6 K/UL (ref 0.8–3.5)
LYMPHOCYTES NFR BLD: 27 % (ref 12–49)
MCH RBC QN AUTO: 33.9 PG (ref 26–34)
MCHC RBC AUTO-ENTMCNC: 33.3 G/DL (ref 30–36.5)
MCV RBC AUTO: 101.7 FL (ref 80–99)
MONOCYTES # BLD: 0.5 K/UL (ref 0–1)
MONOCYTES NFR BLD: 5 % (ref 5–13)
NEUTS SEG # BLD: 6 K/UL (ref 1.8–8)
NEUTS SEG NFR BLD: 64 % (ref 32–75)
NRBC # BLD: 0 K/UL (ref 0–0.01)
NRBC BLD-RTO: 0 PER 100 WBC
P-R INTERVAL, ECG05: 140 MS
PLATELET # BLD AUTO: 106 K/UL (ref 150–400)
PMV BLD AUTO: 11.7 FL (ref 8.9–12.9)
POTASSIUM SERPL-SCNC: 4 MMOL/L (ref 3.5–5.1)
PROT SERPL-MCNC: 7.1 G/DL (ref 6.4–8.2)
Q-T INTERVAL, ECG07: 366 MS
QRS DURATION, ECG06: 72 MS
QTC CALCULATION (BEZET), ECG08: 450 MS
RBC # BLD AUTO: 3.57 M/UL (ref 3.8–5.2)
RBC MORPH BLD: ABNORMAL
SODIUM SERPL-SCNC: 138 MMOL/L (ref 136–145)
TROPONIN-HIGH SENSITIVITY: 5 NG/L (ref 0–51)
VENTRICULAR RATE, ECG03: 91 BPM
WBC # BLD AUTO: 9.5 K/UL (ref 3.6–11)

## 2022-11-30 PROCEDURE — 36415 COLL VENOUS BLD VENIPUNCTURE: CPT

## 2022-11-30 PROCEDURE — 85379 FIBRIN DEGRADATION QUANT: CPT

## 2022-11-30 PROCEDURE — 74011000636 HC RX REV CODE- 636: Performed by: EMERGENCY MEDICINE

## 2022-11-30 PROCEDURE — 84484 ASSAY OF TROPONIN QUANT: CPT

## 2022-11-30 PROCEDURE — 93005 ELECTROCARDIOGRAM TRACING: CPT

## 2022-11-30 PROCEDURE — 71275 CT ANGIOGRAPHY CHEST: CPT

## 2022-11-30 PROCEDURE — 71045 X-RAY EXAM CHEST 1 VIEW: CPT

## 2022-11-30 PROCEDURE — 85025 COMPLETE CBC W/AUTO DIFF WBC: CPT

## 2022-11-30 PROCEDURE — 80053 COMPREHEN METABOLIC PANEL: CPT

## 2022-11-30 PROCEDURE — 99285 EMERGENCY DEPT VISIT HI MDM: CPT

## 2022-11-30 RX ADMIN — IOPAMIDOL 80 ML: 755 INJECTION, SOLUTION INTRAVENOUS at 09:02

## 2022-11-30 NOTE — ED PROVIDER NOTES
HPI   The patient is a 51-year-old white female who presents the emergency room with acute onset of left precordial chest pain for about 24 hours. There is slight shortness of breath associated with it. It is nonexertional she also has quick shooting pains intermittently that last only 1 second or so. The pain is not pleuritic pleuritic. She has no exertional chest pain or symptoms recently has no known coronary artery disease or any other heart disease. She has a history of hepatic steatosis alcohol abuse hypertension and pancreatitis in the past.  Past Medical History:   Diagnosis Date    ETOH abuse     [employer] DAILY; 2-3 YEARS    Hepatic steatosis 2020    Severe    Hypertension     NOT CURRENTLY TAKING BP MEDS, STOPPED 2019. Ill-defined condition     SITUATIONAL ANXIETY AND DEPRESSION; NOT CURRENTLY TAKING MEDICATION.     Pancreatitis     HOSPITALIZED       Past Surgical History:   Procedure Laterality Date    HX  SECTION      x2    HX CHOLECYSTECTOMY      HX GYN  2019    ENDOMETRIAL ABLATION    HX HYSTERECTOMY      HX TUBAL LIGATION           Family History:   Problem Relation Age of Onset    Diabetes Mother     Heart Disease Mother     Hypertension Mother     Alzheimer's Disease Mother     Schizophrenia Mother     Anesth Problems Neg Hx        Social History     Socioeconomic History    Marital status:      Spouse name:     Number of children: Not on file    Years of education: Not on file    Highest education level: Not on file   Occupational History    Not on file   Tobacco Use    Smoking status: Every Day     Packs/day: 1.00     Years: 30.00     Pack years: 30.00     Types: Cigarettes     Start date: 1999    Smokeless tobacco: Never   Substance and Sexual Activity    Alcohol use: Yes     Comment: daily 1/2 gallon liquor daily    Drug use: No    Sexual activity: Yes     Partners: Male     Comment:    Other Topics Concern    Not on file Social History Narrative    Works as / at [employer]     Financial Resource Strain: Not on [employer] Insecurity: Not on file   Transportation Needs: Not on file   Physical Activity: Not on file   Stress: Not on file   Social Connections: Not on file   Intimate Partner Violence: Not on file   Housing Stability: Not on file         ALLERGIES: Pcn [penicillins]    Review of Systems   Constitutional: Negative. Negative for chills and fever. HENT: Negative. Negative for trouble swallowing. Eyes:  Negative for visual disturbance. Respiratory:  Negative for cough, shortness of breath and wheezing. Cardiovascular:  Negative for chest pain, palpitations and leg swelling. Gastrointestinal:  Negative for abdominal pain, diarrhea, nausea and vomiting. Genitourinary:  Negative for dysuria, frequency and urgency. Musculoskeletal:  Negative for arthralgias. Skin:  Negative for wound. Neurological:  Negative for speech difficulty, weakness, numbness and headaches. Psychiatric/Behavioral:  Negative for agitation. All other systems reviewed and are negative. Vitals:    11/30/22 0718   BP: 126/83   Pulse: 91   Resp: 16   Temp: 98.3 °F (36.8 °C)   SpO2: 95%   Weight: 70.1 kg (154 lb 8.7 oz)            Physical Exam  Vitals and nursing note reviewed. Constitutional:       Appearance: She is well-developed. HENT:      Head: Normocephalic and atraumatic. Mouth/Throat:      Pharynx: No oropharyngeal exudate. Eyes:      General: No scleral icterus. Conjunctiva/sclera: Conjunctivae normal.   Neck:      Thyroid: No thyromegaly. Cardiovascular:      Rate and Rhythm: Normal rate and regular rhythm. Heart sounds: Normal heart sounds. No murmur heard. No friction rub. No gallop. Pulmonary:      Effort: Pulmonary effort is normal. No respiratory distress. Breath sounds: Normal breath sounds. No stridor.  No wheezing or rales.   Abdominal:      General: Bowel sounds are normal.      Palpations: Abdomen is soft. Tenderness: There is no abdominal tenderness. There is no guarding or rebound. Musculoskeletal:         General: Normal range of motion. Cervical back: Neck supple. Lymphadenopathy:      Cervical: No cervical adenopathy. Skin:     General: Skin is warm and dry. Neurological:      Mental Status: She is alert and oriented to person, place, and time. MDM     Amount and/or Complexity of Data Reviewed  Clinical lab tests: ordered and reviewed  Tests in the medicine section of CPT®: ordered and reviewed    Risk of Complications, Morbidity, and/or Mortality  Presenting problems: moderate  Diagnostic procedures: moderate  Management options: moderate           Procedures      ED EKG interpretation:  Rhythm: Normal sinus rhythm rate 91 normal axis no acute ST or T T wave changes. This EKG was interpreted by Sienna Chowdhury MD,ED Provider. Assessment and plan the patient had marginally elevated D-dimer requiring CTA to rule out PE which was essentially normal except for enlarged liver and nodularity compatible with patient's diagnosis of hepatic steatosis. Her cardiac enzyme was normal as well we will discharge her home with close follow-up by cardiology and also GI.

## 2022-11-30 NOTE — Clinical Note
[facility]  [address]  [address]  [phone]    Work/School Note    Date: 11/30/2022    To Whom It May concern:    Margoth Romero was seen and treated today in the emergency room by the following provider(s):  Attending Provider: Carmen Inman MD.      Yumiko Benavidez is excused from work/school on 11/30/22 and 12/01/22. She is medically clear to return to work/school on 12/2/2022.        Sincerely,          Sienna Chowdhury MD

## 2022-11-30 NOTE — ED TRIAGE NOTES
ED triage note: ambulatory with a steady gait. Patient reports left sided chest pain/ pressure since yesterday morning. States shortness of breath on exertion.

## 2023-02-27 ENCOUNTER — TRANSCRIBE ORDER (OUTPATIENT)
Dept: SCHEDULING | Age: 48
End: 2023-02-27

## 2023-02-27 DIAGNOSIS — Z12.31 ENCOUNTER FOR SCREENING MAMMOGRAM FOR MALIGNANT NEOPLASM OF BREAST: Primary | ICD-10-CM

## 2023-04-22 DIAGNOSIS — Z12.31 ENCOUNTER FOR SCREENING MAMMOGRAM FOR MALIGNANT NEOPLASM OF BREAST: Primary | ICD-10-CM

## (undated) DEVICE — APPLICATOR FLEXIBLE 360D 40CM --

## (undated) DEVICE — TOTAL TRAY, DB, 100% SILI FOLEY, 16FR 10: Brand: MEDLINE

## (undated) DEVICE — DRAPE SURG EQUIP W105XH13XL20IN 3 ARM DISPOSABLE DA VINCI S

## (undated) DEVICE — Device

## (undated) DEVICE — SUTURE MCRYL SZ 4-0 L27IN ABSRB UD L19MM PS-2 1/2 CIR PRIM Y426H

## (undated) DEVICE — PLUMEPORT LAPAROSCOPIC SMOKE FILTRATION DEVICE: Brand: PLUMEPORT ACTIV

## (undated) DEVICE — SUTURE STRATAFIX SPRL PDS + SZ 2-0 L6IN ABSRB VLT L36MM SXPP1B409

## (undated) DEVICE — DERMABOND SKIN ADH 0.7ML -- DERMABOND ADVANCED 12/BX

## (undated) DEVICE — SYR 10ML LUER LOK 1/5ML GRAD --

## (undated) DEVICE — TROCAR ENDOSCP SHFT L100MM DIA12MM INTEGR STBL ENDOPATH

## (undated) DEVICE — SEALANT FIBRIN 10 CC FRZN PRE FILLED SYR TISSEEL

## (undated) DEVICE — OBTRTR BLDELSS 8MM DISP -- DA VINCI - SNGL USE

## (undated) DEVICE — AGENT HEMSTAT 3GM PURIFIED PLNT STARCH PWD ABSRB ARISTA AH

## (undated) DEVICE — ELECTRO LUBE IS A SINGLE PATIENT USE DEVICE THAT IS INTENDED TO BE USED ON ELECTROSURGICAL ELECTRODES TO REDUCE STICKING.: Brand: KEY SURGICAL ELECTRO LUBE

## (undated) DEVICE — GARMENT,MEDLINE,DVT,INT,CALF,MED, GEN2: Brand: MEDLINE

## (undated) DEVICE — TRAY PREP DRY W/ PREM GLV 2 APPL 6 SPNG 2 UNDPD 1 OVERWRAP

## (undated) DEVICE — INSUFFLATION NEEDLE TO ESTABLISH PNEUMOPERITONEUM.: Brand: INSUFFLATION NEEDLE

## (undated) DEVICE — SURGICAL PROCEDURE PACK GYN LAPAROSCOPY CUST SMH LF

## (undated) DEVICE — VISUALIZATION SYSTEM: Brand: CLEARIFY

## (undated) DEVICE — (D)PREP SKN CHLRAPRP APPL 26ML -- CONVERT TO ITEM 371833

## (undated) DEVICE — INFECTION CONTROL KIT SYS

## (undated) DEVICE — STRAP,POSITIONING,KNEE/BODY,FOAM,4X60": Brand: MEDLINE

## (undated) DEVICE — TIP COVER ACCESSORY

## (undated) DEVICE — PAD,SANITARY,11 IN,MAXI,N-STRL,IND WRAP: Brand: MEDLINE

## (undated) DEVICE — NEEDLE HYPO 22GA L1.5IN BLK S STL HUB POLYPR SHLD REG BVL

## (undated) DEVICE — VCARE MEDIUM, UTERINE MANIPULATOR, VAGINAL-CERVICAL-AHLUWALIA'S-RETRACTOR-ELEVATOR: Brand: VCARE

## (undated) DEVICE — SOLUTION IV 1000ML 0.9% SOD CHL

## (undated) DEVICE — TUBING INSUF HEAT STRL 10 FT --

## (undated) DEVICE — STERILE POLYISOPRENE POWDER-FREE SURGICAL GLOVES WITH EMOLLIENT COATING: Brand: PROTEXIS

## (undated) DEVICE — PAD BD MATTRESS 73X32 IN STD CONVOLUTED FOAM LTX FREE

## (undated) DEVICE — VCARE DX UTERINE MANIPULATOR/INJECTOR CANNULA: Brand: VCARE DX

## (undated) DEVICE — HANDLE LT SNAP ON ULT DURABLE LENS FOR TRUMPF ALC DISPOSABLE

## (undated) DEVICE — TROCAR ENDOSCP L100MM DIA5MM BLDELSS STBL SL THRD OPT VW

## (undated) DEVICE — TROCAR SITE CLOSURE DEVICE: Brand: ENDO CLOSE

## (undated) DEVICE — DRAPE,REIN 53X77,STERILE: Brand: MEDLINE

## (undated) DEVICE — REM POLYHESIVE ADULT PATIENT RETURN ELECTRODE: Brand: VALLEYLAB